# Patient Record
Sex: MALE | Race: WHITE | Employment: OTHER | URBAN - METROPOLITAN AREA
[De-identification: names, ages, dates, MRNs, and addresses within clinical notes are randomized per-mention and may not be internally consistent; named-entity substitution may affect disease eponyms.]

---

## 2017-11-06 ENCOUNTER — APPOINTMENT (OUTPATIENT)
Dept: CT IMAGING | Age: 72
DRG: 064 | End: 2017-11-06
Attending: EMERGENCY MEDICINE
Payer: MEDICARE

## 2017-11-06 ENCOUNTER — APPOINTMENT (OUTPATIENT)
Dept: MRI IMAGING | Age: 72
DRG: 064 | End: 2017-11-06
Attending: FAMILY MEDICINE
Payer: MEDICARE

## 2017-11-06 ENCOUNTER — HOSPITAL ENCOUNTER (INPATIENT)
Age: 72
LOS: 4 days | Discharge: NURSING, REHAB OR LONG-TERM CARE | DRG: 064 | End: 2017-11-10
Attending: EMERGENCY MEDICINE | Admitting: FAMILY MEDICINE
Payer: MEDICARE

## 2017-11-06 DIAGNOSIS — I63.9 CEREBROVASCULAR ACCIDENT (CVA), UNSPECIFIED MECHANISM (HCC): Primary | ICD-10-CM

## 2017-11-06 DIAGNOSIS — R47.01 APHASIA: ICD-10-CM

## 2017-11-06 DIAGNOSIS — I63.412 CEREBROVASCULAR ACCIDENT (CVA) DUE TO EMBOLISM OF LEFT MIDDLE CEREBRAL ARTERY (HCC): ICD-10-CM

## 2017-11-06 PROBLEM — I61.9 ICH (INTRACEREBRAL HEMORRHAGE) (HCC): Status: ACTIVE | Noted: 2017-11-06

## 2017-11-06 LAB
ALBUMIN SERPL-MCNC: 3.4 G/DL (ref 3.5–5)
ALBUMIN/GLOB SERPL: 1 {RATIO} (ref 1.1–2.2)
ALP SERPL-CCNC: 67 U/L (ref 45–117)
ALT SERPL-CCNC: 40 U/L (ref 12–78)
ANION GAP SERPL CALC-SCNC: 8 MMOL/L (ref 5–15)
APPEARANCE UR: CLEAR
AST SERPL-CCNC: 21 U/L (ref 15–37)
ATRIAL RATE: 71 BPM
BACTERIA URNS QL MICRO: NEGATIVE /HPF
BASOPHILS # BLD: 0 K/UL (ref 0–0.1)
BASOPHILS NFR BLD: 0 % (ref 0–1)
BILIRUB SERPL-MCNC: 0.4 MG/DL (ref 0.2–1)
BILIRUB UR QL: NEGATIVE
BUN SERPL-MCNC: 10 MG/DL (ref 6–20)
BUN/CREAT SERPL: 11 (ref 12–20)
CALCIUM SERPL-MCNC: 8.6 MG/DL (ref 8.5–10.1)
CALCULATED P AXIS, ECG09: 32 DEGREES
CALCULATED R AXIS, ECG10: -6 DEGREES
CALCULATED T AXIS, ECG11: 11 DEGREES
CHLORIDE SERPL-SCNC: 107 MMOL/L (ref 97–108)
CO2 SERPL-SCNC: 23 MMOL/L (ref 21–32)
COLOR UR: NORMAL
CREAT SERPL-MCNC: 0.95 MG/DL (ref 0.7–1.3)
DIAGNOSIS, 93000: NORMAL
EOSINOPHIL # BLD: 0 K/UL (ref 0–0.4)
EOSINOPHIL NFR BLD: 1 % (ref 0–7)
EPITH CASTS URNS QL MICRO: NORMAL /LPF
ERYTHROCYTE [DISTWIDTH] IN BLOOD BY AUTOMATED COUNT: 12.4 % (ref 11.5–14.5)
GLOBULIN SER CALC-MCNC: 3.4 G/DL (ref 2–4)
GLUCOSE BLD STRIP.AUTO-MCNC: 139 MG/DL (ref 65–100)
GLUCOSE BLD STRIP.AUTO-MCNC: 202 MG/DL (ref 65–100)
GLUCOSE SERPL-MCNC: 136 MG/DL (ref 65–100)
GLUCOSE UR STRIP.AUTO-MCNC: NEGATIVE MG/DL
HCT VFR BLD AUTO: 39.1 % (ref 36.6–50.3)
HGB BLD-MCNC: 13.6 G/DL (ref 12.1–17)
HGB UR QL STRIP: NEGATIVE
HYALINE CASTS URNS QL MICRO: NORMAL /LPF (ref 0–5)
INR BLD: 1.1 (ref 0.9–1.2)
KETONES UR QL STRIP.AUTO: NEGATIVE MG/DL
LEUKOCYTE ESTERASE UR QL STRIP.AUTO: NEGATIVE
LYMPHOCYTES # BLD: 1.5 K/UL (ref 0.8–3.5)
LYMPHOCYTES NFR BLD: 22 % (ref 12–49)
MCH RBC QN AUTO: 32.2 PG (ref 26–34)
MCHC RBC AUTO-ENTMCNC: 34.8 G/DL (ref 30–36.5)
MCV RBC AUTO: 92.7 FL (ref 80–99)
MONOCYTES # BLD: 0.4 K/UL (ref 0–1)
MONOCYTES NFR BLD: 5 % (ref 5–13)
NEUTS SEG # BLD: 4.9 K/UL (ref 1.8–8)
NEUTS SEG NFR BLD: 72 % (ref 32–75)
NITRITE UR QL STRIP.AUTO: NEGATIVE
P-R INTERVAL, ECG05: 160 MS
PH UR STRIP: 5 [PH] (ref 5–8)
PLATELET # BLD AUTO: 213 K/UL (ref 150–400)
POTASSIUM SERPL-SCNC: 4.5 MMOL/L (ref 3.5–5.1)
PROT SERPL-MCNC: 6.8 G/DL (ref 6.4–8.2)
PROT UR STRIP-MCNC: NEGATIVE MG/DL
Q-T INTERVAL, ECG07: 372 MS
QRS DURATION, ECG06: 70 MS
QTC CALCULATION (BEZET), ECG08: 404 MS
RBC # BLD AUTO: 4.22 M/UL (ref 4.1–5.7)
RBC #/AREA URNS HPF: NORMAL /HPF (ref 0–5)
SERVICE CMNT-IMP: ABNORMAL
SERVICE CMNT-IMP: ABNORMAL
SODIUM SERPL-SCNC: 138 MMOL/L (ref 136–145)
SP GR UR REFRACTOMETRY: 1.01 (ref 1–1.03)
UR CULT HOLD, URHOLD: NORMAL
UROBILINOGEN UR QL STRIP.AUTO: 0.2 EU/DL (ref 0.2–1)
VENTRICULAR RATE, ECG03: 71 BPM
WBC # BLD AUTO: 6.8 K/UL (ref 4.1–11.1)
WBC URNS QL MICRO: NORMAL /HPF (ref 0–4)

## 2017-11-06 PROCEDURE — 70496 CT ANGIOGRAPHY HEAD: CPT

## 2017-11-06 PROCEDURE — 93005 ELECTROCARDIOGRAM TRACING: CPT

## 2017-11-06 PROCEDURE — 36415 COLL VENOUS BLD VENIPUNCTURE: CPT | Performed by: EMERGENCY MEDICINE

## 2017-11-06 PROCEDURE — 70450 CT HEAD/BRAIN W/O DYE: CPT

## 2017-11-06 PROCEDURE — 74011250637 HC RX REV CODE- 250/637: Performed by: FAMILY MEDICINE

## 2017-11-06 PROCEDURE — 65270000029 HC RM PRIVATE

## 2017-11-06 PROCEDURE — 70551 MRI BRAIN STEM W/O DYE: CPT

## 2017-11-06 PROCEDURE — 81001 URINALYSIS AUTO W/SCOPE: CPT | Performed by: EMERGENCY MEDICINE

## 2017-11-06 PROCEDURE — 80053 COMPREHEN METABOLIC PANEL: CPT | Performed by: EMERGENCY MEDICINE

## 2017-11-06 PROCEDURE — 99285 EMERGENCY DEPT VISIT HI MDM: CPT

## 2017-11-06 PROCEDURE — 82962 GLUCOSE BLOOD TEST: CPT

## 2017-11-06 PROCEDURE — 74011000258 HC RX REV CODE- 258: Performed by: EMERGENCY MEDICINE

## 2017-11-06 PROCEDURE — 74011250636 HC RX REV CODE- 250/636: Performed by: FAMILY MEDICINE

## 2017-11-06 PROCEDURE — 85025 COMPLETE CBC W/AUTO DIFF WBC: CPT | Performed by: EMERGENCY MEDICINE

## 2017-11-06 PROCEDURE — 74011636320 HC RX REV CODE- 636/320: Performed by: EMERGENCY MEDICINE

## 2017-11-06 PROCEDURE — 74011636637 HC RX REV CODE- 636/637: Performed by: FAMILY MEDICINE

## 2017-11-06 PROCEDURE — 85610 PROTHROMBIN TIME: CPT

## 2017-11-06 RX ORDER — ATORVASTATIN CALCIUM 20 MG/1
20 TABLET, FILM COATED ORAL
Status: DISCONTINUED | OUTPATIENT
Start: 2017-11-06 | End: 2017-11-10 | Stop reason: HOSPADM

## 2017-11-06 RX ORDER — METFORMIN HYDROCHLORIDE 500 MG/1
500 TABLET ORAL 2 TIMES DAILY WITH MEALS
COMMUNITY

## 2017-11-06 RX ORDER — SODIUM CHLORIDE 0.9 % (FLUSH) 0.9 %
5-10 SYRINGE (ML) INJECTION EVERY 8 HOURS
Status: DISCONTINUED | OUTPATIENT
Start: 2017-11-06 | End: 2017-11-10 | Stop reason: HOSPADM

## 2017-11-06 RX ORDER — ACETAMINOPHEN 325 MG/1
650 TABLET ORAL
Status: DISCONTINUED | OUTPATIENT
Start: 2017-11-06 | End: 2017-11-10 | Stop reason: HOSPADM

## 2017-11-06 RX ORDER — HYDRALAZINE HYDROCHLORIDE 20 MG/ML
20 INJECTION INTRAMUSCULAR; INTRAVENOUS
Status: DISCONTINUED | OUTPATIENT
Start: 2017-11-06 | End: 2017-11-10 | Stop reason: HOSPADM

## 2017-11-06 RX ORDER — SODIUM CHLORIDE 0.9 % (FLUSH) 0.9 %
10 SYRINGE (ML) INJECTION
Status: COMPLETED | OUTPATIENT
Start: 2017-11-06 | End: 2017-11-06

## 2017-11-06 RX ORDER — ASPIRIN 325 MG
325 TABLET ORAL DAILY
Status: DISCONTINUED | OUTPATIENT
Start: 2017-11-07 | End: 2017-11-06

## 2017-11-06 RX ORDER — INSULIN LISPRO 100 [IU]/ML
INJECTION, SOLUTION INTRAVENOUS; SUBCUTANEOUS EVERY 6 HOURS
Status: DISCONTINUED | OUTPATIENT
Start: 2017-11-06 | End: 2017-11-08

## 2017-11-06 RX ORDER — FAMOTIDINE 20 MG/1
20 TABLET, FILM COATED ORAL EVERY 12 HOURS
Status: DISCONTINUED | OUTPATIENT
Start: 2017-11-06 | End: 2017-11-10 | Stop reason: HOSPADM

## 2017-11-06 RX ORDER — ATORVASTATIN CALCIUM 20 MG/1
20 TABLET, FILM COATED ORAL
COMMUNITY

## 2017-11-06 RX ORDER — BROMOCRIPTINE MESYLATE 2.5 MG/1
2.5 TABLET ORAL
Status: DISCONTINUED | OUTPATIENT
Start: 2017-11-06 | End: 2017-11-10 | Stop reason: HOSPADM

## 2017-11-06 RX ORDER — DEXTROSE 50 % IN WATER (D50W) INTRAVENOUS SYRINGE
12.5-25 AS NEEDED
Status: DISCONTINUED | OUTPATIENT
Start: 2017-11-06 | End: 2017-11-10 | Stop reason: HOSPADM

## 2017-11-06 RX ORDER — MAGNESIUM SULFATE 100 %
4 CRYSTALS MISCELLANEOUS AS NEEDED
Status: DISCONTINUED | OUTPATIENT
Start: 2017-11-06 | End: 2017-11-10 | Stop reason: HOSPADM

## 2017-11-06 RX ORDER — SODIUM CHLORIDE 0.9 % (FLUSH) 0.9 %
5-10 SYRINGE (ML) INJECTION AS NEEDED
Status: DISCONTINUED | OUTPATIENT
Start: 2017-11-06 | End: 2017-11-10 | Stop reason: HOSPADM

## 2017-11-06 RX ORDER — SODIUM CHLORIDE 9 MG/ML
75 INJECTION, SOLUTION INTRAVENOUS CONTINUOUS
Status: DISCONTINUED | OUTPATIENT
Start: 2017-11-06 | End: 2017-11-08

## 2017-11-06 RX ORDER — ACETAMINOPHEN 650 MG/1
650 SUPPOSITORY RECTAL
Status: DISCONTINUED | OUTPATIENT
Start: 2017-11-06 | End: 2017-11-10 | Stop reason: HOSPADM

## 2017-11-06 RX ORDER — BROMOCRIPTINE MESYLATE 2.5 MG/1
2.5 TABLET ORAL
COMMUNITY

## 2017-11-06 RX ORDER — ASPIRIN 325 MG
325 TABLET ORAL DAILY
Status: DISCONTINUED | OUTPATIENT
Start: 2017-11-06 | End: 2017-11-10 | Stop reason: HOSPADM

## 2017-11-06 RX ADMIN — IOPAMIDOL 100 ML: 755 INJECTION, SOLUTION INTRAVENOUS at 13:09

## 2017-11-06 RX ADMIN — BROMOCRIPTINE MESYLATE 2.5 MG: 2.5 TABLET ORAL at 21:49

## 2017-11-06 RX ADMIN — Medication 10 ML: at 13:09

## 2017-11-06 RX ADMIN — SODIUM CHLORIDE 75 ML/HR: 900 INJECTION, SOLUTION INTRAVENOUS at 15:22

## 2017-11-06 RX ADMIN — INSULIN LISPRO 3 UNITS: 100 INJECTION, SOLUTION INTRAVENOUS; SUBCUTANEOUS at 18:51

## 2017-11-06 RX ADMIN — SODIUM CHLORIDE 100 ML: 900 INJECTION, SOLUTION INTRAVENOUS at 13:09

## 2017-11-06 RX ADMIN — ATORVASTATIN CALCIUM 20 MG: 20 TABLET, FILM COATED ORAL at 21:49

## 2017-11-06 RX ADMIN — FAMOTIDINE 20 MG: 20 TABLET, FILM COATED ORAL at 21:49

## 2017-11-06 RX ADMIN — Medication 10 ML: at 16:00

## 2017-11-06 RX ADMIN — Medication 10 ML: at 21:49

## 2017-11-06 NOTE — ED NOTES
Spoke with patient's friend Glory Madden [phone] who stated patient was just discharge from [facility] 10/31. Patient was admitted approx two weeks ago for a stroke. He reports that he spoke to the patient Saturday and he was speaking normally and was coherant.

## 2017-11-06 NOTE — ED PROVIDER NOTES
HPI Comments: 71 y.o. male with past medical history significant for DM and hyperlipidemia who presents from home via EMS with chief complaint of aphasia. EMS reports that they were dispatched when the pt's  visiting his home became alarmed by the pt's confusion and expressive and receptive aphasia. Upon EMS arrival, pt was able to follow simple commands and reply yes and no appropriately. However, pt's condition worsened en route as he became less able to follow commands with some possible right arm drift. [facility] notes that the pt spoke on the phone to his friend who noticed some level of confusion but was not concerned - last known well is unknown. EMS notes pt with h/o stroke. There are no other acute medical concerns at this time. Note written by Joanie Michelle, as dictated by Timbo Hoskins MD 11:55 AM         UPDATE -- 1:23 PM  Nurse has spoke to the pt's friend who states that he is the only person that the pt has as he has alienated his family. Friend states that the pt had a stroke and was admitted to [address] [place] [address] and was discharged 6 days ago. Note written by Joanie Michelle, as dictated by Timbo Hoskins MD 1:23 PM      The history is provided by the EMS personnel. The history is limited by the condition of the patient (AMS). No  was used. No past medical history on file. No past surgical history on file. No family history on file. Social History     Social History    Marital status: N/A     Spouse name: N/A    Number of children: N/A    Years of education: N/A     Occupational History    Not on file.      Social History Main Topics    Smoking status: Not on file    Smokeless tobacco: Not on file    Alcohol use Not on file    Drug use: Not on file    Sexual activity: Not on file     Other Topics Concern    Not on file     Social History Narrative         ALLERGIES: Review of patient's allergies indicates not on file.    Review of Systems   Unable to perform ROS: Mental status change       Vitals:    11/06/17 1202   BP: 120/82   Pulse: 74   Resp: 16   Temp: 98.1 °F (36.7 °C)   SpO2: 98%            Physical Exam   Constitutional: He appears well-developed and well-nourished. No distress. HENT:   Head: Normocephalic and atraumatic. Right Ear: External ear normal.   Left Ear: External ear normal.   Nose: Nose normal.   Mouth/Throat: Oropharynx is clear and moist.   Eyes: Conjunctivae and EOM are normal. Pupils are equal, round, and reactive to light. No scleral icterus. Neck: Normal range of motion. Neck supple. No JVD present. No tracheal deviation present. No thyromegaly present. Cardiovascular: Normal rate, regular rhythm and normal heart sounds. Exam reveals no gallop and no friction rub. No murmur heard. Pulmonary/Chest: Effort normal and breath sounds normal. No respiratory distress. He has no wheezes. He has no rales. He exhibits no tenderness. Abdominal: Soft. Bowel sounds are normal. He exhibits no distension and no mass. There is no tenderness. There is no rebound and no guarding. Musculoskeletal: Normal range of motion. He exhibits no edema or tenderness. Lymphadenopathy:     He has no cervical adenopathy. Neurological: He is alert. He has normal strength. He displays no atrophy and no tremor. He exhibits normal muscle tone. Coordination and gait normal.   Both receptive and expressive aphasia. Slight right-sided facial droop. Moving both upper and lower extremities with good strength equally. No pronator drift. Skin: Skin is warm and dry. No rash noted. He is not diaphoretic. No erythema. Psychiatric: Thought content normal.   Unable to assess. Nursing note and vitals reviewed.      Note written by Joanie Gautam, as dictated by Evangelina Atwood MD 1:42 PM      MDM  Number of Diagnoses or Management Options  Diagnosis management comments: Impression: 72-year-old male presenting to the emergency department with onset of slurred speech on Saturday per the son although I have not been able to speak with him.  called EMS this morning for more of slurred speech and difficulty understanding instructions. Per EMS at one time he had a pronounced right facial droop as well as a right pronator drift. His blood sugar was within normal limits. The patient's differential includes TIA versus CVA, doubt this represents intracerebral hemorrhage. Doubt this represents metabolic or infectious etiology. Plan of care will be to continue with code stroke protocol treat accordingly. Critical Care  Total time providing critical care: (Total critical care time spend exclusive of procedures: 60 minutes  )    ED Course       Procedures      CONSULT NOTE:  12:07 PM Johana Rios MD spoke with Dr. Daisha Lee, Consult for TeleNeurology. Discussed available diagnostic tests and clinical findings. She is in agreement with care plans as outlined. Dr. Daisha Lee will evaluate the pt via TeleNeurology screen. 12:26 PM Dr. Daisha Lee evaluated the pt and recommended CTA. Pt is out of the window for other interventions at this time. ED EKG interpretation:  Rhythm: sinus rhythm with premature atrial complexes. Rate (approx.): 71; Axis: normal; ST/T wave: normal.   Note written by Joanie Gibson, as dictated by Johana Rios MD 12:15 PM    CONSULT NOTE:  1:44 PM Johana Rios MD spoke with Dr. Keely Cheatham, Consult for Hospitalist.  Discussed available diagnostic tests and clinical findings. He is in agreement with care plans as outlined. Dr. Keely Cheatham will see and admit the pt for CVA work-up / evaluation.

## 2017-11-06 NOTE — ED NOTES
Patient speaking in clear sentences but unable to get his words out. Patient very frustrated. Able to move all extremities.

## 2017-11-06 NOTE — ED NOTES
Patient to CT on monitor with RN. Upon arrival to CT patient out of bed to restroom. RN stayed at patients side. Gait steady.

## 2017-11-06 NOTE — ED NOTES
Teleneurology on screen at bedside. Patient able to follow some commands with lots of demonstration.

## 2017-11-06 NOTE — ED TRIAGE NOTES
TRIAGE: Patient arrives by EMS from home with difficulty speaking and difficulty following commands. Patient states he didn't feel good this morning. Patient not following commands consistently for NIH. Difficulty finding words.  Patient lives alone and  called EMS for UMicIt stroke\" at 1100AM. BG

## 2017-11-06 NOTE — ED NOTES
Awaiting records from [facility]. Patient in bed with call light in reach and bed alarm on. Will continue to monitor.

## 2017-11-06 NOTE — ROUTINE PROCESS
TRANSFER - OUT REPORT:    Verbal report given to Ara RICH(name) on Rafi Butt  being transferred to ICU(unit) for routine progression of care       Report consisted of patients Situation, Background, Assessment and   Recommendations(SBAR). Information from the following report(s) SBAR, ED Summary, [facility] and Recent Results was reviewed with the receiving nurse. Lines:   Peripheral IV 11/06/17 Left Antecubital (Active)   Site Assessment Clean, dry, & intact 11/6/2017 12:03 PM   Phlebitis Assessment 0 11/6/2017 12:03 PM   Infiltration Assessment 0 11/6/2017 12:03 PM   Dressing Status Clean, dry, & intact 11/6/2017 12:03 PM       Peripheral IV 11/06/17 Right Antecubital (Active)   Site Assessment Clean, dry, & intact 11/6/2017 12:58 PM   Phlebitis Assessment 0 11/6/2017 12:58 PM   Infiltration Assessment 0 11/6/2017 12:58 PM   Dressing Status Clean, dry, & intact 11/6/2017 12:58 PM        Opportunity for questions and clarification was provided.       Patient transported with:   Monitor  Registered Nurse

## 2017-11-06 NOTE — IP AVS SNAPSHOT
[address] [place] [address] 
[phone] Patient: Kofi Dwyer MRN: [id number] :1945 About your hospitalization You were admitted on:  2017 You last received care in the:  [facility] 6S NEURO-SCI TELE You were discharged on:  November 10, 2017 Why you were hospitalized Your primary diagnosis was:  Type Ii Diabetes Mellitus (Hcc) Your diagnoses also included:  Cva (Cerebral Vascular Accident) (Hcc) Things You Need To Do (next 8 weeks) Follow up with Dean Saldivar MD  
  
Phone:  [phone] Where:  [facility], [address] [place] [address] Discharge Orders None A check kanchan indicates which time of day the medication should be taken. My Medications TAKE these medications as instructed Instructions Each Dose to Equal  
 Morning Noon Evening Bedtime  
 aspirin 325 mg tablet Commonly known as:  ASPIRIN Start taking on:  2017 Your last dose was: Your next dose is: Take 1 Tab by mouth daily. 325 mg  
    
   
   
   
  
 atorvastatin 20 mg tablet Commonly known as:  LIPITOR Your last dose was: Your next dose is: Take 20 mg by mouth nightly. 20 mg  
    
   
   
   
  
 bromocriptine 2.5 mg tablet Commonly known as:  PARLODEL Your last dose was: Your next dose is: Take 2.5 mg by mouth nightly. 2.5 mg  
    
   
   
   
  
 clopidogrel 75 mg Tab Commonly known as:  PLAVIX Start taking on:  2017 Your last dose was: Your next dose is: Take 1 Tab by mouth daily. 75 mg  
    
   
   
   
  
 metFORMIN 500 mg tablet Commonly known as:  GLUCOPHAGE Your last dose was: Your next dose is: Take 500 mg by mouth two (2) times daily (with meals). 500 mg  
    
   
   
   
  
 QUEtiapine 25 mg tablet Commonly known as:  SEROquel Your last dose was: Your next dose is: Take 0.5 Tabs by mouth nightly. 12.5 mg Where to Get Your Medications Information on where to get these meds will be given to you by the nurse or doctor. ! Ask your nurse or doctor about these medications  
  aspirin 325 mg tablet  
 clopidogrel 75 mg Tab QUEtiapine 25 mg tablet Discharge Instructions Discharge Instructions PATIENT ID: Carla Contreras MRN: [id number] YOB: 1945 DATE OF ADMISSION: 11/6/2017 11:51 AM   
DATE OF DISCHARGE: 11/10/2017 PRIMARY CARE PROVIDER: Sofia Wells MD  
 
 
DISCHARGING PHYSICIAN: Neto Luna MD   
To contact this individual call [phone] and ask the  to page. If unavailable ask to be transferred the Adult Hospitalist Department. DISCHARGE DIAGNOSES CVA CONSULTATIONS: IP CONSULT TO HOSPITALIST 
IP CONSULT TO NEUROSURGERY 
IP CONSULT TO NEUROLOGY 
IP CONSULT TO PSYCHIATRY IP CONSULT TO CARDIOLOGY PROCEDURES/SURGERIES: * No surgery found * PENDING TEST RESULTS:  
At the time of discharge the following test results are still pending: NA 
 
FOLLOW UP APPOINTMENTS:  
Follow-up Information Follow up With Details Comments Contact Info Sofia Wells MD   [address] [facility] [address] [place] [address] 
[phone] ADDITIONAL CARE RECOMMENDATIONS:  
Please follow up with your primary care provider in 1 to 2 weeks of discharge. Please follow up with Neurologist, Dr Morena Clifford in 3 to 4 weeks of discharge DIET: Cardiac Diet and Diabetic Diet ACTIVITY: Activity as tolerated DISCHARGE MEDICATIONS: 
 See Medication Reconciliation Form · It is important that you take the medication exactly as they are prescribed.   
· Keep your medication in the bottles provided by the pharmacist and keep a list of the medication names, dosages, and times to be taken in your wallet. · Do not take other medications without consulting your doctor. NOTIFY YOUR PHYSICIAN FOR ANY OF THE FOLLOWING:  
Fever over 101 degrees for 24 hours. Chest pain, shortness of breath, fever, chills, nausea, vomiting, diarrhea, change in mentation, falling, weakness, bleeding. Severe pain or pain not relieved by medications. Or, any other signs or symptoms that you may have questions about. DISPOSITION: 
  Home With: 
 OT  PT  [facility]  RN  
  
 SNF/Inpatient Rehab/LTAC Independent/assisted living Hospice Other: CDMP Checked: Yes X Signed:  
Alta Lopez MD 
11/10/2017 
4:04 PM 
 
 
 
  
  
  
Introducing [facility]! Kirstin Johns introduces ESILLAGE patient portal. Now you can access parts of your medical record, email your doctor's office, and request medication refills online. 1. In your internet browser, go to [email or web address] 2. Click on the First Time User? Click Here link in the Sign In box. You will see the New Member Sign Up page. 3. Enter your ESILLAGE Access Code exactly as it appears below. You will not need to use this code after youve completed the sign-up process. If you do not sign up before the expiration date, you must request a new code. · ESILLAGE Access Code: [email or web address] Expires: 2/6/2018  1:48 PM 
 
4. Enter the last four digits of your Social Security Number (xxxx) and Date of Birth (mm/dd/yyyy) as indicated and click Submit. You will be taken to the next sign-up page. 5. Create a ESILLAGE ID. This will be your ESILLAGE login ID and cannot be changed, so think of one that is secure and easy to remember. 6. Create a ESILLAGE password. You can change your password at any time. 7. Enter your Password Reset Question and Answer. This can be used at a later time if you forget your password. 8. Enter your e-mail address. You will receive e-mail notification when new information is available in [address] 9. Click Sign Up. You can now view and download portions of your medical record. 10. Click the Download Summary menu link to download a portable copy of your medical information. If you have questions, please visit the Frequently Asked Questions section of the [employer] website. Remember, [employer] is NOT to be used for urgent needs. For medical emergencies, dial 911. Now available from your iPhone and Android! Providers Seen During Your Hospitalization Provider Specialty Primary office phone Brandon Navarrete MD Emergency Medicine [phone] Mohamud Craig MD Hospitalist [phone] Ban Reese MD [facility] [phone] Your Primary Care Physician (PCP) Primary Care Physician Office Phone Office Fax Juni Lopez [phone] You are allergic to the following No active allergies Recent Documentation Weight Smoking Status 84.8 kg Never Assessed Emergency Contacts Name Discharge Info Relation Home Work Mobile Unknown,Unknown N/A  AT THIS TIME [6] Unknown [9] [phone] Peggi Ayers  Caregiver [13] [phone] Patient Belongings The following personal items are in your possession at time of discharge: 
  Dental Appliances: None  Visual Aid: None      Home Medications: None   Jewelry: None  Clothing: At bedside, Shirt    Other Valuables: None Please provide this summary of care documentation to your next provider. Signatures-by signing, you are acknowledging that this After Visit Summary has been reviewed with you and you have received a copy. Patient Signature:  ____________________________________________________________  Date:  ____________________________________________________________  
  
Vazquez Moses    
    
 Provider Signature:  ____________________________________________________________ Date:  ____________________________________________________________

## 2017-11-06 NOTE — PROGRESS NOTES
Spiritual Care Assessment/Progress Notes    Naveen Kramer [id number]  [id number]    1945  71 y.o.  male    Patient Telephone Number: There is no home phone number on file. Hinduism Affiliation:    Language:    No emergency contact information on file. There are no active problems to display for this patient. Date: 11/6/2017       Level of Hinduism/Spiritual Activity:  []         Involved in maria e tradition/spiritual practice    []         Not involved in maria e tradition/spiritual practice  []         Spiritually oriented    []         Claims no spiritual orientation    []         seeking spiritual identity  []         Feels alienated from Mosque practice/tradition  []         Feels angry about Mosque practice/tradition  [x]         Spirituality/Mosque tradition IS a resource for coping at this time.   []         Not able to assess due to medical condition    Services Provided Today:  [x]         crisis intervention    []         reading Scriptures  [x]         spiritual assessment    []         prayer  [x]         empathic listening/emotional support  []         rites and rituals (cite in comments)  []         life review     []         Mosque support  []         theological development   []         advocacy  []         ethical dialog     []         blessing  []         bereavement support    []         support to family  []         anticipatory grief support   []         help with AMD  []         spiritual guidance    []         meditation      Spiritual Care Needs  [x]         Emotional Support  []         Spiritual/Hinduism Care  []         Loss/Adjustment  []         Advocacy/Referral                /Ethics  []         No needs expressed at               this time  []         Other: (note in               comments)  [address]  []         Follow up visits with               pt/family  []         Provide materials  []         Schedule sacraments  []         Contact Community               Clergy  [x]         Follow up as needed  []         Other: (note in               comments)     Comments: Responded to Code Stroke called for Mr Shailesh Diaz in ED-30. Mr Shailesh Diaz was lying quietly on stretcher and nurse was present at time of 's arrival. No family was present. Offered brief words of support to patient, who was having great difficulty trying to express himself verbally. Patient was able to give permission for  to keep him in prayers. : Rev. Lauren Saunders; [facility], to contact [address] [facility] call: [phone]

## 2017-11-06 NOTE — PROGRESS NOTES
Admission Medication Reconciliation:    Information obtained from:  patient, medication from home    Comments/Recommendations:   Due to the patient's condition, could not confirm medication allergies or time of medication administration. It does appear the the patient took his medications on 11/7/17. Changes made to Prior to Admission (PTA) Medication List:   ?   Medications Added:   - None   ? Medications Changed:   - changed administration times of atorvastatin and bromocriptine to QHS  ? Medications Removed:   - None         Significant PMH/Disease States:   Past Medical History:   Diagnosis Date    Diabetes ([facility] [address]     Hyperlipemia        Chief Complaint for this Admission:    Chief Complaint   Patient presents with    Aphasia       Allergies:  Review of patient's allergies indicates not on file. Prior to Admission Medications:   Prior to Admission Medications   Prescriptions Last Dose Informant Patient Reported? Taking?   atorvastatin (LIPITOR) 20 mg tablet 11/5/2017 at Unknown time  Yes Yes   Sig: Take 20 mg by mouth daily. bromocriptine (PARLODEL) 2.5 mg tablet 11/5/2017 at Unknown time  Yes Yes   Sig: Take 2.5 mg by mouth daily. metFORMIN (GLUCOPHAGE) 500 mg tablet 11/5/2017 at Unknown time  Yes Yes   Sig: Take 500 mg by mouth two (2) times daily (with meals). Facility-Administered Medications: None       Thank you for allowing pharmacy to participate in the coordination of this patient's care. If you have any other questions, please contact the medication reconciliation pharmacist at [phone]. Marianela Cm, Pharm. D.

## 2017-11-06 NOTE — PROGRESS NOTES
Intensivist    Pt is a 70 yo male with h/o ischemic CVA 2 weeks ago and was in [facility] EMERGENCY [facility] and was recently discharged home. This am he had worsened expressive aphasia and R facial droop and was brought to [facility]. Head CT shows area of ischemia (subacute in the L parietal region with tiny area of punctate hemorrhage)    Not a tpa candidate  CTA - no obvious occlusion    He says that he feels better and moves all extremities. He is alert and does have some trouble finding words. His son at bedside says that is new compared to a phone conversation he had with him over the weekend    Not on File  Past Medical History:   Diagnosis Date    Diabetes ([facility] [address]     Hyperlipemia      History reviewed. No pertinent surgical history. Prior to Admission medications    Medication Sig Start Date End Date Taking? Authorizing Provider   bromocriptine (PARLODEL) 2.5 mg tablet Take 2.5 mg by mouth nightly. Yes Kulwant Flores MD   atorvastatin (LIPITOR) 20 mg tablet Take 20 mg by mouth nightly. Yes Kulwant Flores MD   metFORMIN (GLUCOPHAGE) 500 mg tablet Take 500 mg by mouth two (2) times daily (with meals).    Yes Kulwant Flores MD     Patient Vitals for the past 4 hrs:   Temp Pulse Resp BP SpO2   11/06/17 1516 98 °F (36.7 °C) 79 21 147/88 -   11/06/17 1445 98 °F (36.7 °C) 69 17 150/82 100 %   11/06/17 1430 - 71 19 (!) 149/94 98 %   11/06/17 1415 - 63 14 134/88 97 %   11/06/17 1400 - 86 23 (!) 153/96 98 %   11/06/17 1330 - 71 20 (!) 161/93 96 %   11/06/17 1230 - 76 21 (!) 142/99 96 %   11/06/17 1215 - 77 18 (!) 138/16 96 %   11/06/17 1205 - - - 120/82 -   11/06/17 1202 98.1 °F (36.7 °C) 74 16 120/82 98 %       Alert  Expressive aphasia  Moves all extremities equally  Mmm  CTA  RRR    CT and CTA as above  MRI brain ordered    Lab:  Recent Labs      11/06/17   1205  11/06/17   1202   WBC  6.8   --    HGB  13.6   --    PLT  213   --    NA  138   --    K  4.5   --    CL  107   --    CO2  23   --    BUN  10   --    CREA 0.95   --    GLU  136*   --    CA  8.6   --    INR   --   1.1   TBILI  0.4   --    SGOT  21   --      Impression:  1. Subacute L parietal CVA with small punctate hemorrhage   2.  Aphasia - no new occlusion on CTA - not a tpa candidate - apparently had dysphagia on discharge from [facility]  3. Small ICH  4. DM    --No NS issues  --would have neuro eval  --OK for NSTU from a critical care standpoint    Fanny Simmons MD

## 2017-11-06 NOTE — ROUTINE PROCESS
TRANSFER - IN REPORT:    Verbal report received from [address] RN(name) on Darian Mane  being received from ED(unit) for routine progression of care      Report consisted of patients Situation, Background, Assessment and   Recommendations(SBAR). Information from the following report(s) SBAR, Kardex, ED Summary, Procedure Summary, Intake/Output, MAR, Accordion and Recent Results was reviewed with the receiving nurse. Opportunity for questions and clarification was provided. Assessment completed upon patients arrival to unit and care assumed.

## 2017-11-06 NOTE — PROGRESS NOTES
CM asked if I could assist them in finding out any information on patient due to him arriving by EMS and they had only received a call from a  that did not give them a name or number. There is no Insurance information or anyone that answers the phone number that is on patients face sheet. I have tried looking at patient and trying to identify him on facebook but, was unable to find him. In the mean time someone has called into the ER and spoken to his nurse and she has a name and number at this time.   Hannah Rojas RN CRM

## 2017-11-06 NOTE — CONSULTS
Full consult to follow. Briefly, 70 yo hx of CVA admitted with new CVA symptoms (expressive aphasia). Head CT shows previously documented left parieto-occipital CVA with a small area of punctate hemorrhage with no mass effect. No role for neurosurgical intervention at this point. Thank you for this consultation.

## 2017-11-06 NOTE — CONSULTS
Neuro consult completed, dictated note to follow. Pt was just admitted to [facility] after presenting with N/V/aphasia, found to have acute left multifocal MCA infarcts, diagnosed with hypercholesterolemia, DM, and left ICA stenosis 50-79% during the admission, started on ASA 325mg daily, Lipitor 20mg daily, and metformin 500mg bid. He was discharged from [address] [place] [address] on 10/31/17. In review of the notes, current aphasia is definitely worse than at discharge from [facility]. Differential includes new CVA vs seizure with post-ictal Todds. Petechial hemorrhage in old ischemic CVA is not a concern. Does not need to be in the ICU. Continue ASA 325mg daily. MRI brain and EEG.

## 2017-11-06 NOTE — IP AVS SNAPSHOT
[address] 
[phone] Patient: Iris Skelton MRN: [id number] :1945 My Medications TAKE these medications as instructed Instructions Each Dose to Equal  
 Morning Noon Evening Bedtime  
 aspirin 325 mg tablet Commonly known as:  ASPIRIN Start taking on:  2017 Your last dose was: Your next dose is: Take 1 Tab by mouth daily. 325 mg  
    
   
   
   
  
 atorvastatin 20 mg tablet Commonly known as:  LIPITOR Your last dose was: Your next dose is: Take 20 mg by mouth nightly. 20 mg  
    
   
   
   
  
 bromocriptine 2.5 mg tablet Commonly known as:  PARLODEL Your last dose was: Your next dose is: Take 2.5 mg by mouth nightly. 2.5 mg  
    
   
   
   
  
 clopidogrel 75 mg Tab Commonly known as:  PLAVIX Start taking on:  2017 Your last dose was: Your next dose is: Take 1 Tab by mouth daily. 75 mg  
    
   
   
   
  
 metFORMIN 500 mg tablet Commonly known as:  GLUCOPHAGE Your last dose was: Your next dose is: Take 500 mg by mouth two (2) times daily (with meals). 500 mg  
    
   
   
   
  
 QUEtiapine 25 mg tablet Commonly known as:  SEROquel Your last dose was: Your next dose is: Take 0.5 Tabs by mouth nightly. 12.5 mg Where to Get Your Medications Information on where to get these meds will be given to you by the nurse or doctor. ! Ask your nurse or doctor about these medications  
  aspirin 325 mg tablet  
 clopidogrel 75 mg Tab QUEtiapine 25 mg tablet

## 2017-11-07 PROBLEM — I63.9 CVA (CEREBRAL VASCULAR ACCIDENT) (HCC): Status: ACTIVE | Noted: 2017-11-06

## 2017-11-07 LAB
CHOLEST SERPL-MCNC: 103 MG/DL
ERYTHROCYTE [DISTWIDTH] IN BLOOD BY AUTOMATED COUNT: 12.5 % (ref 11.5–14.5)
EST. AVERAGE GLUCOSE BLD GHB EST-MCNC: 171 MG/DL
GLUCOSE BLD STRIP.AUTO-MCNC: 125 MG/DL (ref 65–100)
GLUCOSE BLD STRIP.AUTO-MCNC: 125 MG/DL (ref 65–100)
GLUCOSE BLD STRIP.AUTO-MCNC: 140 MG/DL (ref 65–100)
GLUCOSE BLD STRIP.AUTO-MCNC: 141 MG/DL (ref 65–100)
HBA1C MFR BLD: 7.6 % (ref 4.2–6.3)
HCT VFR BLD AUTO: 39.2 % (ref 36.6–50.3)
HDLC SERPL-MCNC: 40 MG/DL
HDLC SERPL: 2.6 {RATIO} (ref 0–5)
HGB BLD-MCNC: 13.9 G/DL (ref 12.1–17)
LDLC SERPL CALC-MCNC: 42.8 MG/DL (ref 0–100)
LIPID PROFILE,FLP: NORMAL
MCH RBC QN AUTO: 32.6 PG (ref 26–34)
MCHC RBC AUTO-ENTMCNC: 35.5 G/DL (ref 30–36.5)
MCV RBC AUTO: 92 FL (ref 80–99)
PLATELET # BLD AUTO: 216 K/UL (ref 150–400)
RBC # BLD AUTO: 4.26 M/UL (ref 4.1–5.7)
SERVICE CMNT-IMP: ABNORMAL
TRIGL SERPL-MCNC: 101 MG/DL (ref ?–150)
VLDLC SERPL CALC-MCNC: 20.2 MG/DL
WBC # BLD AUTO: 6.7 K/UL (ref 4.1–11.1)

## 2017-11-07 PROCEDURE — 97161 PT EVAL LOW COMPLEX 20 MIN: CPT

## 2017-11-07 PROCEDURE — G8979 MOBILITY GOAL STATUS: HCPCS

## 2017-11-07 PROCEDURE — 36415 COLL VENOUS BLD VENIPUNCTURE: CPT | Performed by: FAMILY MEDICINE

## 2017-11-07 PROCEDURE — 74011636637 HC RX REV CODE- 636/637: Performed by: FAMILY MEDICINE

## 2017-11-07 PROCEDURE — 97165 OT EVAL LOW COMPLEX 30 MIN: CPT

## 2017-11-07 PROCEDURE — 74011250637 HC RX REV CODE- 250/637: Performed by: PSYCHIATRY & NEUROLOGY

## 2017-11-07 PROCEDURE — 95816 EEG AWAKE AND DROWSY: CPT | Performed by: PSYCHIATRY & NEUROLOGY

## 2017-11-07 PROCEDURE — 65660000000 HC RM CCU STEPDOWN

## 2017-11-07 PROCEDURE — 93306 TTE W/DOPPLER COMPLETE: CPT

## 2017-11-07 PROCEDURE — 83036 HEMOGLOBIN GLYCOSYLATED A1C: CPT | Performed by: FAMILY MEDICINE

## 2017-11-07 PROCEDURE — 82962 GLUCOSE BLOOD TEST: CPT

## 2017-11-07 PROCEDURE — G8978 MOBILITY CURRENT STATUS: HCPCS

## 2017-11-07 PROCEDURE — 85027 COMPLETE CBC AUTOMATED: CPT | Performed by: FAMILY MEDICINE

## 2017-11-07 PROCEDURE — 80061 LIPID PANEL: CPT | Performed by: FAMILY MEDICINE

## 2017-11-07 PROCEDURE — 74011250637 HC RX REV CODE- 250/637: Performed by: FAMILY MEDICINE

## 2017-11-07 RX ADMIN — FAMOTIDINE 20 MG: 20 TABLET, FILM COATED ORAL at 23:03

## 2017-11-07 RX ADMIN — Medication 10 ML: at 22:00

## 2017-11-07 RX ADMIN — Medication 10 ML: at 13:16

## 2017-11-07 RX ADMIN — ATORVASTATIN CALCIUM 20 MG: 20 TABLET, FILM COATED ORAL at 23:03

## 2017-11-07 RX ADMIN — INSULIN LISPRO 2 UNITS: 100 INJECTION, SOLUTION INTRAVENOUS; SUBCUTANEOUS at 13:16

## 2017-11-07 RX ADMIN — FAMOTIDINE 20 MG: 20 TABLET, FILM COATED ORAL at 09:08

## 2017-11-07 RX ADMIN — ASPIRIN 325 MG: 325 TABLET ORAL at 09:08

## 2017-11-07 RX ADMIN — Medication 10 ML: at 05:00

## 2017-11-07 NOTE — ROUTINE PROCESS
Bedside, Verbal and Written shift change report given to Raegan Rivera RN (oncoming nurse) by Maria C Diaz RN (offgoing nurse). Report included the following information SBAR, Kardex, ED Summary, Procedure Summary, Intake/Output, MAR, Accordion and Recent Results.

## 2017-11-07 NOTE — PROGRESS NOTES
Hospitalist Progress Note          Soco Hough MD  Please call  and page for questions. Call physician on-call through the  7pm-7am    Daily Progress Note: 11/7/2017    Primary care provider:Kulwant Flores MD    Date of admission: 11/6/2017 11:51 AM    Admission summery and hospital course:  81-year-old gentleman with past medical history of diabetes and hyperlipidemia, who presents to the hospital complaining of aphasia. Pt was just admitted to [facility] after presenting with N/V/aphasia, found to have acute left multifocal MCA infarcts, diagnosed with hypercholesterolemia, DM, and left ICA stenosis 50-79% during the admission, started on ASA 325mg daily, Lipitor 20mg daily, and metformin 500mg bid. He was discharged from [address] [place] [address] on 10/31/17. The patient apparently had his  visit at his home and the  got concerned because the patient was confused and was having expressive as well as receptive aphasia. EMS was called. Subjective:   Patient said she/he is feeling better today. Assessment/Plan:   CVA:  Improving. Continue PT/OT and SLT. Aphasia is definitely worse than at discharge from [facility] as per neurologist.  MRI showed moderate to large area of infarction predominantly in the left parietal and left temporal lobes with minimal associated superimposed hemorrhage in the left parietal lobe. There are small punctate foci of infarction in theperiventricular white matter on the left as well. Continue ASA, statin and serial neurological evaluation. Follow EEG report and neurology recommendation. Transfer to neuro floor. Encephalopathy: Due to above. Monitor. Hypertension:   BP is reasonable now. Continue to monitor with current medicine. DM:   Monitor with SSI for now. See orders for other plans. VTE prophylaxis: SCD. Code status: Full  Discussed plan of care with Patient/Family and Nurse. Pre-admission lived at home. Discharge planning: pending. Review of Systems:     Review of Systems:  Symptom  Y/N  Comments   Symptom  Y/N  Comments    Fever/Chills  n    Chest Pain  n    Poor Appetite  n    Edema   n    Cough  n   Abdominal Pain   n    Sputum  n   Joint Pain  n    SOB/LAUREANO  n   Pruritis/Rash      Nausea/vomit  n   Tolerating PT/OT      Diarrhea     Tolerating Diet      Constipation     Other      Could not obtain due to:         Objective:   Physical Exam:     Visit Vitals    BP (!) 111/91    Pulse 67    Temp 98.8 °F (37.1 °C)    Resp 19    Wt 88.2 kg (194 lb 7.1 oz)    SpO2 90%      O2 Device: Room air    Temp (24hrs), Av.4 °F (36.9 °C), Min:98 °F (36.7 °C), Max:99.3 °F (37.4 °C)    701 - 1900  In: 375 [I.V.:375]  Out: 200 [Urine:200]   1901 - 700  In: 1292.5 [P.O.:120; I.V.:1172.5]  Out: 1350 [Urine:1350]      General:  Alert, cooperative, no distress, appears stated age. Lungs:   Clear to auscultation bilaterally. Heart:  Regular rate and rhythm, S1, S2 normal, no murmur. Abdomen:   Soft, non-tender. Obese. Bowel sounds normal.   Extremities: Extremities normal, atraumatic, no cyanosis or edema. Skin: Skin color, texture, turgor normal. No rashes or lesions   Neurologic: CNII-XII intact. Patient is still confused. Data Review:       Recent Days:  Recent Labs      17   0452  17   1205   WBC  6.7  6.8   HGB  13.9  13.6   HCT  39.2  39.1   PLT  216  213     Recent Labs      17   1205  17   1202   NA  138   --    K  4.5   --    CL  107   --    CO2  23   --    GLU  136*   --    BUN  10   --    CREA  0.95   --    CA  8.6   --    ALB  3.4*   --    SGOT  21   --    ALT  40   --    INR   --   1.1     No results for input(s): PH, PCO2, PO2, HCO3, FIO2 in the last 72 hours.     24 Hour Results:  Recent Results (from the past 24 hour(s))   GLUCOSE, POC    Collection Time: 17  6:48 PM   Result Value Ref Range    Glucose (POC) 202 (H) 65 - 100 mg/dL Performed by Louise Wyman    GLUCOSE, POC    Collection Time: 11/07/17 12:08 AM   Result Value Ref Range    Glucose (POC) 125 (H) 65 - 100 mg/dL    Performed by Nicole Hassan    LIPID PANEL    Collection Time: 11/07/17  4:52 AM   Result Value Ref Range    LIPID PROFILE          Cholesterol, total 103 <200 MG/DL    Triglyceride 101 <150 MG/DL    HDL Cholesterol 40 MG/DL    LDL, calculated 42.8 0 - 100 MG/DL    VLDL, calculated 20.2 MG/DL    CHOL/HDL Ratio 2.6 0 - 5.0     HEMOGLOBIN A1C WITH EAG    Collection Time: 11/07/17  4:52 AM   Result Value Ref Range    Hemoglobin A1c 7.6 (H) 4.2 - 6.3 %    Est. average glucose 171 mg/dL   CBC W/O DIFF    Collection Time: 11/07/17  4:52 AM   Result Value Ref Range    WBC 6.7 4.1 - 11.1 K/uL    RBC 4.26 4.10 - 5.70 M/uL    HGB 13.9 12.1 - 17.0 g/dL    HCT 39.2 36.6 - 50.3 %    MCV 92.0 80.0 - 99.0 FL    MCH 32.6 26.0 - 34.0 PG    MCHC 35.5 30.0 - 36.5 g/dL    RDW 12.5 11.5 - 14.5 %    PLATELET 839 793 - 082 K/uL   GLUCOSE, POC    Collection Time: 11/07/17  4:56 AM   Result Value Ref Range    Glucose (POC) 141 (H) 65 - 100 mg/dL    Performed by Nicole Hassan    GLUCOSE, POC    Collection Time: 11/07/17  1:07 PM   Result Value Ref Range    Glucose (POC) 140 (H) 65 - 100 mg/dL    Performed by Louise Wyman        Problem List:  Problem List as of 11/7/2017  Date Reviewed: 11/6/2017          Codes Class Noted - Resolved    * (Principal)CVA (cerebral vascular accident) [facility]) ICD-10-CM: I63.9  ICD-9-CM: 434.91  11/6/2017 - Present              Medications reviewed  Current Facility-Administered Medications   Medication Dose Route Frequency    atorvastatin (LIPITOR) tablet 20 mg  20 mg Oral QHS    bromocriptine (PARLODEL) tablet 2.5 mg  2.5 mg Oral QHS    sodium chloride (NS) flush 5-10 mL  5-10 mL IntraVENous Q8H    sodium chloride (NS) flush 5-10 mL  5-10 mL IntraVENous PRN    acetaminophen (TYLENOL) tablet 650 mg  650 mg Oral Q4H PRN    Or    acetaminophen (TYLENOL) solution 650 mg  650 mg Per NG tube Q4H PRN    Or    acetaminophen (TYLENOL) suppository 650 mg  650 mg Rectal Q4H PRN    0.9% sodium chloride infusion  75 mL/hr IntraVENous CONTINUOUS    famotidine (PEPCID) tablet 20 mg  20 mg Oral Q12H    glucose chewable tablet 16 g  4 Tab Oral PRN    dextrose (D50W) injection syrg 12.5-25 g  12.5-25 g IntraVENous PRN    glucagon (GLUCAGEN) injection 1 mg  1 mg IntraMUSCular PRN    insulin lispro (HUMALOG) injection   SubCUTAneous Q6H    hydrALAZINE (APRESOLINE) 20 mg/mL injection 20 mg  20 mg IntraVENous Q6H PRN    aspirin (ASPIRIN) tablet 325 mg  325 mg Oral DAILY       Care Plan discussed with: Patient/Family, Nurse and     Total time spent with patient: 30 minutes.     Estefania Quispe MD

## 2017-11-07 NOTE — PROGRESS NOTES
Neurology Progress Note     NAME: Kaur Edwarsd   :  1945   MRN:  [id number]   DATE:  2017    Assessment:     Principal Problem:    CVA (cerebral vascular accident) ([address] (2017)      Pt is a 80yo male recently admitted to [facility] on 10/23 with aphasia, nausea and vomiting, found to have multiple left MCA infarcts diagnosed with hypercholesterolemia, and diabetes and left ICA stenosis during that admission. He was started on aspirin 325 mg a day,  Lipitor, Metformin and discharged from rehab on 10/30, presenting 17 with worsened aphasia. MRI brain with new acute infarct on left, predominantly in temporal lobe. CTA of neck with only 50% left ICA stenosis. LDL 42.8. HgbA1C 7.6. Plan:   -Echo pending, if neg, may need DAIN  -Continue ASA   -Consider adding Plavix pending echo results  -Continue Atorvastatin  -PT/OT/ST  -Plan to transfer to floor today    Chart reviewed since last seen  Subjective:   Pt agitated and stating that he wants to leave to get to class. He then perseverates on \"class\" making it difficult to fully understand what he is trying to tell me. He then calls me \"sweetheart\" and tells me he needs to leave. With the RN's assistance, we spoke to his cousin who lives in [place] and is not his POA. She was going to speak with him and try to convince him to stay. She also suggested we call Dr. Derrek Dominguez his PCP.      Objective:     Current Facility-Administered Medications   Medication Dose Route Frequency    atorvastatin (LIPITOR) tablet 20 mg  20 mg Oral QHS    bromocriptine (PARLODEL) tablet 2.5 mg  2.5 mg Oral QHS    sodium chloride (NS) flush 5-10 mL  5-10 mL IntraVENous Q8H    sodium chloride (NS) flush 5-10 mL  5-10 mL IntraVENous PRN    acetaminophen (TYLENOL) tablet 650 mg  650 mg Oral Q4H PRN    Or    acetaminophen (TYLENOL) solution 650 mg  650 mg Per NG tube Q4H PRN    Or    acetaminophen (TYLENOL) suppository 650 mg  650 mg Rectal Q4H PRN    0.9% sodium chloride infusion  75 mL/hr IntraVENous CONTINUOUS    famotidine (PEPCID) tablet 20 mg  20 mg Oral Q12H    glucose chewable tablet 16 g  4 Tab Oral PRN    dextrose (D50W) injection syrg 12.5-25 g  12.5-25 g IntraVENous PRN    glucagon (GLUCAGEN) injection 1 mg  1 mg IntraMUSCular PRN    insulin lispro (HUMALOG) injection   SubCUTAneous Q6H    hydrALAZINE (APRESOLINE) 20 mg/mL injection 20 mg  20 mg IntraVENous Q6H PRN    aspirin (ASPIRIN) tablet 325 mg  325 mg Oral DAILY       Visit Vitals    BP (!) 135/91    Pulse 74    Temp 99.3 °F (37.4 °C)    Resp 19    Wt 88.2 kg (194 lb 7.1 oz)    SpO2 95%     Temp (24hrs), Av.3 °F (36.8 °C), Min:98 °F (36.7 °C), Max:99.3 °F (37.4 °C)      701 - 1900  In: -   Out: 200 [Urine:200]  1901 - 700  In: 1292.5 [P.O.:120; I.V.:1172.5]  Out: 1350 [Urine:1350]      Physical Exam:  General: Well developed well nourished patient in no apparent distress. Cardiac: Regular rate and rhythm with no murmurs. Extremities: 2+ Radial pulses, no cyanosis or edema    Neurological Exam:  Mental Status: Agitated oriented to person, difficulty following commands, spontaneous speech is improved, perseverates   Cranial Nerves:   EOMI, no nystagmus, no ptosis. Facial movement is asymmetric on right.      Motor:     Reflexes:      Sensory:      Gait:     Cerebellar:           Lab Review   Recent Results (from the past 24 hour(s))   GLUCOSE, POC    Collection Time: 17 12:00 PM   Result Value Ref Range    Glucose (POC) 139 (H) 65 - 100 mg/dL    Performed by Edmundo Parker    POC INR    Collection Time: 17 12:02 PM   Result Value Ref Range    INR (POC) 1.1 <1.2     CBC WITH AUTOMATED DIFF    Collection Time: 17 12:05 PM   Result Value Ref Range    WBC 6.8 4.1 - 11.1 K/uL    RBC 4.22 4.10 - 5.70 M/uL HGB 13.6 12.1 - 17.0 g/dL    HCT 39.1 36.6 - 50.3 %    MCV 92.7 80.0 - 99.0 FL    MCH 32.2 26.0 - 34.0 PG    MCHC 34.8 30.0 - 36.5 g/dL    RDW 12.4 11.5 - 14.5 %    PLATELET 220 823 - 688 K/uL    NEUTROPHILS 72 32 - 75 %    LYMPHOCYTES 22 12 - 49 %    MONOCYTES 5 5 - 13 %    EOSINOPHILS 1 0 - 7 %    BASOPHILS 0 0 - 1 %    ABS. NEUTROPHILS 4.9 1.8 - 8.0 K/UL    ABS. LYMPHOCYTES 1.5 0.8 - 3.5 K/UL    ABS. MONOCYTES 0.4 0.0 - 1.0 K/UL    ABS. EOSINOPHILS 0.0 0.0 - 0.4 K/UL    ABS. BASOPHILS 0.0 0.0 - 0.1 K/UL   METABOLIC PANEL, COMPREHENSIVE    Collection Time: 11/06/17 12:05 PM   Result Value Ref Range    Sodium 138 136 - 145 mmol/L    Potassium 4.5 3.5 - 5.1 mmol/L    Chloride 107 97 - 108 mmol/L    CO2 23 21 - 32 mmol/L    Anion gap 8 5 - 15 mmol/L    Glucose 136 (H) 65 - 100 mg/dL    BUN 10 6 - 20 MG/DL    Creatinine 0.95 0.70 - 1.30 MG/DL    BUN/Creatinine ratio 11 (L) 12 - 20      GFR est AA >60 >60 ml/min/1.73m2    GFR est non-AA >60 >60 ml/min/1.73m2    Calcium 8.6 8.5 - 10.1 MG/DL    Bilirubin, total 0.4 0.2 - 1.0 MG/DL    ALT (SGPT) 40 12 - 78 U/L    AST (SGOT) 21 15 - 37 U/L    Alk.  phosphatase 67 45 - 117 U/L    Protein, total 6.8 6.4 - 8.2 g/dL    Albumin 3.4 (L) 3.5 - 5.0 g/dL    Globulin 3.4 2.0 - 4.0 g/dL    A-G Ratio 1.0 (L) 1.1 - 2.2     EKG, 12 LEAD, INITIAL    Collection Time: 11/06/17 12:11 PM   Result Value Ref Range    Ventricular Rate 71 BPM    Atrial Rate 71 BPM    P-R Interval 160 ms    QRS Duration 70 ms    Q-T Interval 372 ms    QTC Calculation (Bezet) 404 ms    Calculated P Axis 32 degrees    Calculated R Axis -6 degrees    Calculated T Axis 11 degrees    Diagnosis       Sinus rhythm with premature atrial complexes  No previous ECGs available  Confirmed by Juan Pascual M.D., Magali Rubio ([id number]) on 11/6/2017 4:44:27 PM     URINALYSIS W/MICROSCOPIC    Collection Time: 11/06/17  1:13 PM   Result Value Ref Range    Color YELLOW/STRAW      Appearance CLEAR CLEAR      Specific gravity 1.014 1.003 - 1.030 pH (UA) 5.0 5.0 - 8.0      Protein NEGATIVE  NEG mg/dL    Glucose NEGATIVE  NEG mg/dL    Ketone NEGATIVE  NEG mg/dL    Bilirubin NEGATIVE  NEG      Blood NEGATIVE  NEG      Urobilinogen 0.2 0.2 - 1.0 EU/dL    Nitrites NEGATIVE  NEG      Leukocyte Esterase NEGATIVE  NEG      WBC 0-4 0 - 4 /hpf    RBC 0-5 0 - 5 /hpf    Epithelial cells FEW FEW /lpf    Bacteria NEGATIVE  NEG /hpf    Hyaline cast 0-2 0 - 5 /lpf   URINE CULTURE HOLD SAMPLE    Collection Time: 11/06/17  1:13 PM   Result Value Ref Range    Urine culture hold URINE ON HOLD IN MICROBIOLOGY DEPT FOR 3 DAYS     GLUCOSE, POC    Collection Time: 11/06/17  6:48 PM   Result Value Ref Range    Glucose (POC) 202 (H) 65 - 100 mg/dL    Performed by Laurence Farley    GLUCOSE, POC    Collection Time: 11/07/17 12:08 AM   Result Value Ref Range    Glucose (POC) 125 (H) 65 - 100 mg/dL    Performed by Lauryn Kinney    LIPID PANEL    Collection Time: 11/07/17  4:52 AM   Result Value Ref Range    LIPID PROFILE          Cholesterol, total 103 <200 MG/DL    Triglyceride 101 <150 MG/DL    HDL Cholesterol 40 MG/DL    LDL, calculated 42.8 0 - 100 MG/DL    VLDL, calculated 20.2 MG/DL    CHOL/HDL Ratio 2.6 0 - 5.0     HEMOGLOBIN A1C WITH EAG    Collection Time: 11/07/17  4:52 AM   Result Value Ref Range    Hemoglobin A1c 7.6 (H) 4.2 - 6.3 %    Est. average glucose 171 mg/dL   CBC W/O DIFF    Collection Time: 11/07/17  4:52 AM   Result Value Ref Range    WBC 6.7 4.1 - 11.1 K/uL    RBC 4.26 4.10 - 5.70 M/uL    HGB 13.9 12.1 - 17.0 g/dL    HCT 39.2 36.6 - 50.3 %    MCV 92.0 80.0 - 99.0 FL    MCH 32.6 26.0 - 34.0 PG    MCHC 35.5 30.0 - 36.5 g/dL    RDW 12.5 11.5 - 14.5 %    PLATELET 609 351 - 452 K/uL   GLUCOSE, POC    Collection Time: 11/07/17  4:56 AM   Result Value Ref Range    Glucose (POC) 141 (H) 65 - 100 mg/dL    Performed by Lauryn Kinney        Additional comments:  I have reviewed the patient's new clinical lab test results.   I have personally reviewed the patient's radiographs. MRI   MRI Results (most recent):    Results from [place] encounter on 11/06/17   MRI BRAIN WO CONT   Narrative Preliminary report: Acute left parietal and temporal infarcts with several foci  of associated acute left parietal intraparenchymal hemorrhage, unchanged  compared to prior CT dated November 6, 2017. Several additional acute left  frontal, parietal and left basal ganglia acute subcentimeter infarcts, not  visualized on prior CT. Final report to follow. EXAM:  MRI BRAIN WO CONT  Clinical history: Speech changes  INDICATION:    Speech changes    COMPARISON:  11/6/2017 CTA. CONTRAST: None    TECHNIQUE:    MR imaging of the brain was performed with sagittal T1, axial T1, T2, FLAIR,  GRE, DWI/ADC; multiplanar T1 images . FINDINGS:   Moderate to large area of acute infarction predominantly in the left parietal  and temporal and lobes. Additional scattered foci of infarction in the  periventricular white matter on the left. No right-sided infarctions are  demonstrated. Punctate foci of hemorrhage are associated with the infarction in  the left parietal lobe. There is no Chiari or syrinx. There are degenerative changes in the upper  cervical spine with canal stenosis at C3-C4. Pituitary infundibulum  unremarkable. . No midline shift or mass effect. Minimal mucoperiosteal  thickening in the maxillary sinuses greater on the left. . Cavernous sinuses are  symmetric. Swanson Junk Normal appearing flow-voids are present in the vertebral, basilar  and carotid artery systems. The craniocervical junction is normal.  The  structures at the cranial base including paranasal sinuses and mastoid air cells  are unremarkable. Impression IMPRESSION:      Moderate to large area of infarction predominantly in the left parietal and left  temporal lobes with minimal associated superimposed hemorrhage in the left  parietal lobe.  There are small punctate foci of infarction in the  periventricular white matter on the left as well. There is no midline shift or mass effect. Care Plan discussed with:  Patient x   Family x   RN x   Care Manager    Consultant/Specialist:       Signed: Alayna Palm MD

## 2017-11-07 NOTE — PROGRESS NOTES
Physical Therapy Note    Order acknowledged and chart reviewed. Spoke with RN who reports pt is currently agitated and unable to appropriately participate in skilled PT evaluation. Will plan to follow up later this PM as appropriate.     Thank you,  Arianne Benitez, PT, DPT

## 2017-11-07 NOTE — PROGRESS NOTES
Problem: Mobility Impaired (Adult and Pediatric)  Goal: *Acute Goals and Plan of Care (Insert Text)  Physical Therapy Goals  Initiated 11/7/2017  1. Patient will move from supine to sit and sit to supine  and scoot up and down in bed with independence within 7 day(s). 2.  Patient will transfer from bed to chair and chair to bed with independence using the least restrictive device within 7 day(s). 3.  Patient will perform sit to stand with independence within 7 day(s). 4.  Patient will ambulate with independence for 400 feet with the least restrictive device within 7 day(s). 5.  Patient will improve Ruff Balance score by 7 points within 7 days. physical Therapy EVALUATION- neuro population    Patient: Kari Cui (79 y.o. male)  Date: 11/7/2017  Primary Diagnosis: ICH (intracerebral hemorrhage) (HCC)        Precautions:        ASSESSMENT :  Based on the objective data described below, the patient presents with impaired functional mobility as compared to baseline level 2* emotional lability (very agitated this AM), impulsiveness and poor safety awareness leading to impaired gait and stability following admission for new acute ICH. Some expressive aphasia noted throughout session, session limited by transferring to [facility] for routine progression of care. He was able to complete sit<>stands and ambulate hallway with up to CGA and cues to slow pace for safety - mild L sided path drifts and ?inattention noted. Anticipate that he his near his baseline level and may be appropriate for discharge home with family assist and HHPT pending progress and verification of PLOF details. Will plan for follow up for 1-2 additional visits to ensure safety and consistency with mobility. Patient will benefit from skilled intervention to address the above impairments.   Patients rehabilitation potential is considered to be Good  Factors which may influence rehabilitation potential include:   []           None noted  [x] Mental ability/status  []           Medical condition  []           Home/family situation and support systems  [x]           Safety awareness  []           Pain tolerance/management  []           Other:      PLAN :  Recommendations and Planned Interventions:  [x]             Bed Mobility Training             [x]      Neuromuscular Re-Education  [x]             Transfer Training                   []      Orthotic/Prosthetic Training  [x]             Gait Training                         []      Modalities  [x]             Therapeutic Exercises           []      Edema Management/Control  [x]             Therapeutic Activities            [x]      Patient and Family Training/Education  []             Other (comment):  Frequency/Duration: Patient will be followed by physical therapy 3 times a week to address goals. Discharge Recommendations: Home Health  Further Equipment Recommendations for Discharge: none anticipated     SUBJECTIVE:   Patient stated I have to pee.     OBJECTIVE DATA SUMMARY:   HISTORY:    Past Medical History:   Diagnosis Date    Diabetes ([facility] [address]     Hyperlipemia    History reviewed. No pertinent surgical history. Prior Level of Function/Home Situation: PLOF details need to be verified. Recently discharge from rehab following prior CVA. Personal factors and/or comorbidities impacting plan of care:     EXAMINATION/PRESENTATION/DECISION MAKING:   Critical Behavior:  Neurologic State: Alert  Orientation Level: Oriented to person  Cognition: Impulsive, Follows commands, Impaired decision making  Safety/Judgement: Awareness of environment, Decreased awareness of need for assistance, Decreased awareness of need for safety  Hearing:     Skin:  Intact where exposed  Edema: none noted  Range Of Motion:  AROM: Within functional limits  PROM: Within functional limits        Strength:    Strength:  Within functional limits      Tone & Sensation:   Tone: Normal   Sensation: Intact Coordination:  Coordination: Generally decreased, functional  Vision:   Tracking: Able to track stimulus in all quadrants w/o difficulty  Acuity: Within Defined Limits  Corrective Lenses: Reading glasses  Functional Mobility:  Bed Mobility:     Supine to Sit:  (Received seated in recliner)     Transfers:  Sit to Stand: Contact guard assistance  Stand to Sit: Supervision     Balance:   Sitting: Intact  Standing: Impaired  Standing - Static: Good  Standing - Dynamic : Fair  Ambulation/Gait Training:  Distance (ft): 250 Feet (ft)  Assistive Device: Gait belt  Ambulation - Level of Assistance: Contact guard assistance  Gait Description (WDL): Exceptions to WDL  Gait Abnormalities: Path deviations;Trunk sway increased  Base of Support: Narrowed  Speed/Leonila: Fluctuations  Step Length: Right shortened;Left shortened       Functional Measure  Ruff Balance Test:    Sitting to Standing: 3  Standing Unsupported: 2  Sitting with Back Unsupported: 4  Standing to Sitting: 3  Transfers: 2  Standing Unsupported with Eyes Closed: 0  Standing Unsupported with Feet Together: 0  Reach Forward with Outstretched Arm: 2   Object: 0  Turn to Look Over Shoulders: 2  Turn 360 Degrees: 1  Alternate Foot on Step/Stool: 0  Standing Unsupported One Foot in Front: 0  Stand on One Le  Total: 19         56=Maximum possible score;   0-20=High fall risk  21-40=Moderate fall risk   41-56=Low fall risk     Ruff Balance Test and G-code impairment scale:  Percentage of Impairment CH    0%   CI    1-19% CJ    20-39% CK    40-59% CL    60-79% CM    80-99% CN     100%   Ruff   Score 0-56 56 45-55 34-44 23-33 12-22 1-11 0       G codes: In compliance with CMSs Claims Based Outcome Reporting, the following G-code set was chosen for this patient based on their primary functional limitation being treated:     The outcome measure chosen to determine the severity of the functional limitation was the Juares with a score of 19/56 which was correlated with the impairment scale. ? Mobility - Walking and Moving Around:     - CURRENT STATUS: CL - 60%-79% impaired, limited or restricted    - GOAL STATUS: CK - 40%-59% impaired, limited or restricted    - D/C STATUS:  ---------------To be determined---------------     Physical Therapy Evaluation Charge Determination   History Examination Presentation Decision-Making   HIGH Complexity :3+ comorbidities / personal factors will impact the outcome/ POC  MEDIUM Complexity : 3 Standardized tests and measures addressing body structure, function, activity limitation and / or participation in recreation  LOW Complexity : Stable, uncomplicated  HIGH Complexity : FOTO score of 1- 25       Based on the above components, the patient evaluation is determined to be of the following complexity level: LOW       Pain:  Pain Scale 1: Numeric (0 - 10)  Pain Intensity 1: 0         Activity Tolerance:   NAD  Please refer to the flowsheet for vital signs taken during this treatment. After treatment:   [x]     Patient left in no apparent distress sitting up in wheelchair to transport to [facility] with RN  []     Patient left in no apparent distress in bed  []     Call bell left within reach  [x]     Nursing notified  []     Caregiver present  []     Bed alarm activated    COMMUNICATION/EDUCATION:   The patients plan of care was discussed with: Occupational Therapist and Registered Nurse. Patient was educated regarding His deficit(s) of aphasia as this relates to His diagnosis of ICH. He demonstrated Good understanding as evidenced by nodding. Patient and/or family was verbally educated on the BE FAST acronym for signs/symptoms of CVA and TIA. All questions answered with patient indicating good understanding. [x]  Fall prevention education was provided and the patient/caregiver indicated understanding. [x]  Patient/family have participated as able in goal setting and plan of care.   [x]  Patient/family agree to work toward stated goals and plan of care. []  Patient understands intent and goals of therapy, but is neutral about his/her participation. []  Patient is unable to participate in goal setting and plan of care.     Thank you for this referral.  Kel Eason, PT , DPT   Time Calculation: 12 mins

## 2017-11-07 NOTE — PROGRESS NOTES
Cm reviewed chart and noted that patient had a stoke on 10/19 17 and admitted to [facility].  He stayed int  hospital until 10/23/17 and discharged to [place] [address].. He was discharged home 10/31/17 with home health services- [address] [place] [address]. Chart indicates that the SW visited patient 11/6/17 and found patient confused and aphasia. He was brought to ER and admitted with intracranial hemorrhage . No AMD in chart. No PCP listed. The only emergency number is a friend-- Denise Garcia [phone]. Chart indicates patient works at [employer]. CM met with patient and Dr Tiffany Ferrer in patient's room to introduce self and explain role. Patient was alert but confused. It was difficult to understand him. He did confirm that he lives alone in an apartment. He said he has been receiving some therapy at home. He said he has no family in the area. Cm talked with Missy Gomez at [facility] EMRE TUCKER--[phone]--[facility] was able to inform CM that patient has [employer]  [id number]  [phone]). She confirmed dates of inpatient rehab--10/23/17/-10/31/17and that  home health PT/OT Nursing and SW was arranged with [address] [place] [address]. CM talked with ritchie [phone]  at the agency and was told that the SW visited patient yesterday (11/6/17). Patient's PCP is Melba Witt-- he has signed the treatment plan for [facility] for patient. CM placed Dr HERNÁNDEZ [facility] name in [facility]. CM sent email to resource center Carlos Willard)  and MIRANDA Simpson) regarding patient's insurance providing insurance number and telephone number. At this time transition of care needs not determined. Therapy will evaluate and make recommendations (PT/OT and speech). Cm will follow and make referrals for rehab/ [facility] as ordered    Care Management Interventions  PCP Verified by CM:  Yes  Mode of Transport at Discharge:  (TBD)  Transition of Care Consult (CM Consult): Discharge Planning  Physical Therapy Consult: Yes  Occupational Therapy Consult: Yes  Speech Therapy Consult: Yes  Current Support Network: Lives Alone (lives alone in apartment   working until last admission with CVA.   No AMD)  Confirm Follow Up Transport: Friends  Plan discussed with Pt/Family/Caregiver: Yes  Freedom of Choice Offered: Yes  Discharge Location  Discharge Placement:  (TBD  HH/rehab)

## 2017-11-07 NOTE — PROGRESS NOTES
Speech pathology  Orders received, attempted to see patient for language evaluation; however, upon entering room, patient agitated, stating that \"nobody is understanding, he wants to leave if nobody is going to do anything and that he had to go to the bathroom\". RN in to assist with the restroom. Will f/u later today for language eval. RN reports no difficulty swallowing. He passed STAND is on a regular diet. Dixie Baker M.S., CCC-SLP    15:18 Re-attempted to see for language evaluation but patient was working with OT/PT and then going down to [facility]. Will f/u tomorrow.  Dixie Baker M.S., CCC-SLP

## 2017-11-07 NOTE — PROGRESS NOTES
Problem: Self Care Deficits Care Plan (Adult)  Goal: *Acute Goals and Plan of Care (Insert Text)  Occupational Therapy Goals  Initiated 11/7/2017  1. Patient will perform upper body ADLs standing 5 mins without fatigue or LOB with independence within 7 day(s). 2.  Patient will participate in upper extremity therapeutic exercise/activities with independence for 10 minutes within 7 day(s). 3.  Patient will utilize energy conservation and good SAFETY awareness techniques during functional activities without cues within 7 day(s). Occupational Therapy EVALUATION  Patient: Nita Loving (19 y.o. male)  Date: 11/7/2017  Primary Diagnosis: ICH (intracerebral hemorrhage) ([facility] [address]        Precautions: Fall       ASSESSMENT :  Based on the objective data described below, the patient presents with overall CGA-SBA for functional mobility and ADLs s/p ICH. Patient received seated in chair, per RN preparing for transfer to [facility]. Patient demonstrating full and intact ROM, strength, and coordination in [address] [place] [address]. Primary limitations related to emotional lability (extremely agitated this AM with word finding difficulties and hospitalization), decreased safety awareness, impulsivity, and mildly decreased balance. Patient appears to be close to his baseline, recently discharged from inpatient rehab on 10/31/2017. Patient does not have family in the area. Will follow up 1-2 more times during admission to attempt safety and energy conservation education/training. Recommend HHOT at discharge to ensure safe patient environment. Patient will benefit from skilled intervention to address the above impairments.   Patients rehabilitation potential is considered to be Good  Factors which may influence rehabilitation potential include:   []             None noted  []             Mental ability/status  []             Medical condition  []             Home/family situation and support systems  [x]             Safety awareness  []             Pain tolerance/management  []             Other:      PLAN :  Recommendations and Planned Interventions:  []               Self Care Training                  [x]        Therapeutic Activities  []               Functional Mobility Training    [x]        Cognitive Retraining  [x]               Therapeutic Exercises           []        Endurance Activities  [x]               Balance Training                   []        Neuromuscular Re-Education  []               Visual/Perceptual Training     [x]   Home Safety Training  [x]               Patient Education                 [x]        Family Training/Education  []               Other (comment):    Frequency/Duration: Patient will be followed by occupational therapy 1 time a week to address goals. Discharge Recommendations: [address] [place] [address] for home safety evaluation  Further Equipment Recommendations for Discharge: Anticipate no needs     SUBJECTIVE:   Patient stated Don't forget my class!  Patient indicating to cell phone    OBJECTIVE DATA SUMMARY:   HISTORY:   Past Medical History:   Diagnosis Date    Diabetes ([facility] [address]     Hyperlipemia    History reviewed. No pertinent surgical history. Prior Level of Function/Environment/Context: Per chart review, patient lives at home alone. Does not have family, is estranged from them. Completing ADLs/IADLs independently. Expanded or extensive additional review of patient history:        []  Right hand dominant   []  Left hand dominant    EXAMINATION OF PERFORMANCE DEFICITS:  Cognitive/Behavioral Status:  Neurologic State: Alert  Orientation Level: Oriented to person;Oriented to place  Cognition: Follows commands;Decreased attention/concentration; Impulsive;Poor safety awareness  Perception: Appears intact  Perseveration: No perseveration noted  Safety/Judgement: Fall prevention;Decreased awareness of need for assistance    Skin: Appears intact    Edema: None noted in BUEs    Hearing:       Vision/Perceptual:    Tracking: Able to track stimulus in all quadrants w/o difficulty                      Acuity: Within Defined Limits    Corrective Lenses: Reading glasses    Range of Motion:  AROM: Within functional limits  PROM: Within functional limits                      Strength:  Strength: Within functional limits                Coordination:  Coordination: Within functional limits  Fine Motor Skills-Upper: Left Intact; Right Intact    Gross Motor Skills-Upper: Left Intact; Right Intact    Tone & Sensation:  Tone: Normal  Sensation: Intact                      Balance:  Sitting: Intact  Standing: Impaired  Standing - Static: Good  Standing - Dynamic : Fair    Functional Mobility and Transfers for ADLs:  Bed Mobility:  Supine to Sit:  (Received seated in recliner)    Transfers:  Sit to Stand: Contact guard assistance  Stand to Sit: Supervision  Toilet Transfer : Supervision    ADL Assessment:  Feeding: Independent    Oral Facial Hygiene/Grooming: Independent    Bathing: Supervision    Upper Body Dressing: Independent    Lower Body Dressing: Supervision    Toileting: Supervision         * Inferred per obs of functional mobility, activity tolerance, patient endurance, and safety awareness       ADL Intervention and task modifications:    Lower Body Dressing Assistance  Underpants:  Independent  Socks: Supervision/set-up  Leg Crossed Method Used: Yes  Position Performed: Seated in chair    Toileting  Bladder Hygiene: Supervision/set-up  Bowel Hygiene: Supervision/set-up  Clothing Management: Supervision/set-up    Cognitive Retraining  Safety/Judgement: Fall prevention;Decreased awareness of need for assistance    Functional Measure:  Barthel Index:    Bathin  Bladder: 10  Bowels: 10  Groomin  Dressing: 10  Feeding: 10  Mobility: 10  Stairs: 0  Toilet Use: 10  Transfer (Bed to Chair and Back): 10  Total: 75       Barthel and G-code impairment scale:  Percentage of impairment CH  0% CI  1-19% CJ  20-39% CK  40-59% CL  60-79% CM  80-99% CN  100% Barthel Score 0-100 100 99-80 79-60 59-40 20-39 1-19   0   Barthel Score 0-20 20 17-19 13-16 9-12 5-8 1-4 0      The Barthel ADL Index: Guidelines  1. The index should be used as a record of what a patient does, not as a record of what a patient could do. 2. The main aim is to establish degree of independence from any help, physical or verbal, however minor and for whatever reason. 3. The need for supervision renders the patient not independent. 4. A patient's performance should be established using the best available evidence. Asking the patient, friends/relatives and nurses are the usual sources, but direct observation and common sense are also important. However direct testing is not needed. 5. Usually the patient's performance over the preceding 24-48 hours is important, but occasionally longer periods will be relevant. 6. Middle categories imply that the patient supplies over 50 per cent of the effort. 7. Use of aids to be independent is allowed. Avis Delaney., Barthel, D.W. (0896). Functional evaluation: the Barthel Index. [facility] (14)2. TOMI VillaseñorF, Daljit Newberry., Juanjose Jimenez., [place], [address] [facility] (1999). Measuring the change indisability after inpatient rehabilitation; comparison of the responsiveness of the Barthel Index and Functional [place] Measure. Journal of Neurology, Neurosurgery, and Psychiatry, 66(4), 510-351. Allen Perez NTAMMY.ORACIO, MARIO DallasJ.CHEL, & Alma Becerra M.A. (2004.) Assessment of post-stroke quality of life in cost-effectiveness studies: The usefulness of the Barthel Index and the EuroQoL-5D. Quality of Life Research, 13, 100-78         G codes: In compliance with CMSs Claims Based Outcome Reporting, the following G-code set was chosen for this patient based on their primary functional limitation being treated:     The outcome measure chosen to determine the severity of the functional limitation was the Barthel Index with a score of 75/100 which was correlated with the impairment scale. ? Self Care:     - CURRENT STATUS: CJ - 20%-39% impaired, limited or restricted    - GOAL STATUS: CI - 1%-19% impaired, limited or restricted    - D/C STATUS:  ---------------To be determined---------------     Occupational Therapy Evaluation Charge Determination   History Examination Decision-Making   LOW Complexity : Brief history review  LOW Complexity : 1-3 performance deficits relating to physical, cognitive , or psychosocial skils that result in activity limitations and / or participation restrictions  MEDIUM Complexity : Patient may present with comorbidities that affect occupational performnce. Miniml to moderate modification of tasks or assistance (eg, physical or verbal ) with assesment(s) is necessary to enable patient to complete evaluation       Based on the above components, the patient evaluation is determined to be of the following complexity level: LOW   Pain:  Pain Scale 1: Numeric (0 - 10)  Pain Intensity 1: 0              Activity Tolerance:   Good. Please refer to the flowsheet for vital signs taken during this treatment. After treatment:   [x] Patient left in no apparent distress sitting up in chair  [] Patient left in no apparent distress in bed  [x] Call bell left within reach  [x] Nursing notified  [x] Caregiver present  [] Bed alarm activated    COMMUNICATION/EDUCATION:   The patients plan of care was discussed with: Physical Therapist and Registered Nurse.  [] Home safety education was provided and the patient/caregiver indicated understanding. [x] Patient/family have participated as able in goal setting and plan of care. [] Patient/family agree to work toward stated goals and plan of care. [] Patient understands intent and goals of therapy, but is neutral about his/her participation. [] Patient is unable to participate in goal setting and plan of care. This patients plan of care is appropriate for delegation to [facility].     Thank you for this referral.  Amish Gross, OT  Time Calculation: 12 mins

## 2017-11-07 NOTE — PROGRESS NOTES
1924 Bedside and Verbal shift change report given to Josette Wallis RN  (oncoming nurse) by  Annemarie Lechuga RN (offgoing nurse). Report included the following information SBAR, Kardex, ED Summary, Intake/Output, MAR, Accordion, Recent Results, Med Rec Status, Cardiac Rhythm normal sinus  and Alarm Parameters . Friends at bedside at this time  2000 Assessment done at this time, patient is noted to be aphasic, expressive, no further neuro deficits noted. Patient is watching TV he denies any pain. 2030 Dr Alicia Edge here to see patient, no new orders received at this time  2050 Patient to MRI at this time, no distress  2145 Patient back from MRI, no distress at this time. Patient back to bed  0000 Reassessment done at this time , no distress noted at this time, aphasia still present  0200 Patient sleeping, no distress noted  0400 Reassessment done at this time, no changes noted. 0500 Labs drawn at this, glucose checked at 141, patient is due 2 units of insulin, he refused he stated that was not one of his medications ,nurse attempted to instruct patient on insulin, but patient refused the injection at this time  0730 Bedside and Verbal shift change report given to [address] [place] [address]  (oncoming nurse) by Josette Wallis RN  (offgoing nurse). Report included the following information SBAR, Kardex, MAR, Accordion, Recent Results, Med Rec Status, Cardiac Rhythm normal sinus  and Alarm Parameters .

## 2017-11-07 NOTE — CONSULTS
[address] [facility] [address] [facility]   [address] [facility]       Name:  Keyanna Clark   MR#:  [id number]   :  1945   Account #:  [de-identified]    Date of Consultation:  2017   Date of Adm:  2017       DATE OF CONSULTATION: 2017. REASON FOR CONSULTATION: Intracranial hemorrhage. HISTORY OF PRESENT ILLNESS: This is a 59-year-old gentleman   who had a stroke in the left parietal occipital region. This was on   2017. He was admitted to [facility]. At that time, he   apparently presented with dizziness and vision changes. Workup there   included an MRI that showed his area of stroke. He improved and went to rehab on   2017. He was just discharged from rehabilitation on   2017. Today, a  visited him at   home and was concerned because the patient had increased   confusion and increased aphasia. He was only able to say yes or no. Medical records say that he did become worse during transit to the   emergency room with some question of right upper extremity   weakness. Workup included a head CT that showed a small area of   punctate hemorrhage within the area of his previous stroke. He was   admitted to the ICU. Neurology has been consulted. He was admitted   to the hospitalist service. PAST MEDICAL HISTORY: Diabetes, hypercholesterolemia. PAST SURGICAL HISTORY: Unknown. CURRENT MEDICATIONS    1. Aspirin 325 mg.   2. Lipitor 20 mg.   3. Bromocriptine 2.5 mg at bedtime. 4. Pepcid 20 mg every day. 5. Sliding scale insulin. ALLERGIES: NO KNOWN DRUG ALLERGIES. FAMILY HISTORY: Unknown. SOCIAL HISTORY: He lives alone. REVIEW OF SYSTEMS: Unobtainable. PHYSICAL EXAMINATION   VITAL SIGNS: Afebrile, blood pressure 131/90, pulse 75. GENERAL: This is a well-developed, well-nourished gentleman who is   examined lying in the bed. NEUROLOGIC: He is alert.  He has a conjugate gaze and a symmetric   face. He has an expressive aphasia. He is able to give me some parts   of his medical history, but it is difficult to interpret. He is able to follow   commands. He has no pronator drift. There is full strength in bilateral   upper extremities, full strength in bilateral lower extremities. Sensory is   grossly intact. IMAGING STUDIES: He has a head CT that shows encephalomalacia,   consistent with a stroke in the left parietal occipital region. There is a   small punctate hemorrhage. ASSESSMENT AND PLAN: This is a 50-year-old gentleman who has   had a stroke 2 weeks ago, who presents with a transient episode of   increased confusion and increased aphasia that seems to have   Resolved and he is now at his baseline. He is scheduled to get an MRI. Neurology is following. We   will repeat his head CT in the morning to rule out any expansion of the   punctate hemorrhage. Thank you for this consultation.         MD GABRIELLE Barry / Saeed Steel   D:  11/06/2017   20:47   T:  11/07/2017   02:15   Job #:  [id number]

## 2017-11-07 NOTE — CDMP QUERY
Please clarify if this patient is being treated/managed for:    =>Encephalopathy in the setting of CVA requiring neurology consult/high fall risk precautions  =>Other Explanation of clinical findings  =>Unable to Determine (no explanation of clinical findings)    The medical record reflects the following clinical findings, treatment, and risk factors:    Risk Factors: CVA  Clinical Indicators:ED-Upon EMS arrival, pt was able to follow simple commands and reply yes and no appropriately. However, pt's condition worsened en route as he became less able to follow commands with some possible right arm drift. [facility] [place] that the pt spoke on the phone to his friend who noticed some level of confusion but was not concerned - last known well is unknown   H/P- got concerned because the patient was confused and was having expressive as well as receptive aphasia  Treatment: neurology consult/high fall risk precautions    Please clarify and document your clinical opinion in the progress notes and discharge summary including the definitive and/or presumptive diagnosis, (suspected or probable), related to the above clinical findings. Please include clinical findings supporting your diagnosis.     Thank you,         Uyen Ortega [facility]

## 2017-11-07 NOTE — H&P
[address] [facility] [address]   HISTORY AND PHYSICAL       Name:  Twila Giordano   MR#:  [id number]   :  1945   Account #:  [de-identified]        Date of Adm:  2017       CHIEF COMPLAINT: Aphasia. HISTORY OF PRESENT ILLNESS: The patient is a 73-year-old   gentleman with past medical history of diabetes and hyperlipidemia,   who presents to the hospital complaining of aphasia. History was    suboptimal as patient has aphasia and does not answer most of my   questions. History was relied upon from the chart as well as very   limited conversation with the patient. The patient apparently had his    visit today at his home and the  got   concerned because the patient was confused and was having   expressive as well as receptive aphasia. EMS was called. On EMS   arrival, the patient was able to follow simple commands and replied   yes and no appropriately; however, the patient's condition worsened en   route and he became less able to follow commands with some   possible right arm drift. EMS notes also state that the patient spoke to   the friend on his phone and noticed some level of confusion, but was   not concerned, last known well was . Currently, the patient is lying in   bed, is quite aphasic, both having receptive as well as expressive   aphasia, keeps saying \"when all this will get over\" to all my questions,   but nods indicating that he does not have any headache, blurry vision,   sore throat, trouble swallowing, any chest pain, any lightheadedness,   dizziness, abdominal pain, constipation, diarrhea, urinary symptoms,   any recent falls, injuries or any other concerns or problems. PAST MEDICAL HISTORY: See above. HOME MEDICATIONS: Currently the patient's home medication list   states that he is on:   1. Bromocriptine 2.5 mg nightly. 2. Lipitor 20 mg nightly. 3. Metformin 500 mg b.i.d.     SOCIAL HISTORY: Could not be obtained from the patient. FAMILY HISTORY: Could not be obtained from the patient. REVIEW OF SYSTEMS: Suboptimal, but patient denies any other   complaints or problems besides the symptoms mentioned above on a   further review of systems. PHYSICAL EXAMINATION:   VITAL SIGNS: Temperature 98, pulse 69, respiratory rate 17, blood   pressure 150/82, pulse oximetry 100% on room air. GENERAL: Aphasic. HEENT: Pupils equal and reactive to light. Dry mucous membranes. NECK: Supple. CHEST: Clear to auscultation bilaterally. CORONARY: S1, S2 were heard. ABDOMEN: Soft, nontender, nondistended. Bowel sounds are   physiologic. EXTREMITIES: No clubbing, no cyanosis, no edema. NEUROPSYCHIATRIC: DTR 2+/4. Strength 5/5 into 4. Sensory   appears to be grossly within normal limits. Cranial nerves 2-12 appear   to be grossly intact. SKIN: Warm. LABORATORY DATA: White count 6.8, hemoglobin 13.6, hematocrit   39.1, platelets 217. Urine shows no signs of infection. Sodium 138,   potassium , chloride 107, bicarbonate 23, anion gap 8, glucose 136,   BUN , creatinine 0.95, calcium 8.6, bilirubin total 0.48, ALT 40, AST   21, alkaline phosphatase 67. CT of the head shows an area of   ischemic change in the left parietal cortex with punctate hemorrhages   within it, age of area of ischemia is indeterminate and may be   subacute. The CTA that was done shows  parietal acute/subacute   infarct with associated acute intraparenchymal hemorrhage,   unchanged compared to prior CT on November 6th. ASSESSMENT AND PLAN:   1. Cerebrovascular accident with  intracranial hemorrhage. The   patient will be admitted to the hospital. I spoke with Dr. Kristan Saldivar from   Neurosurgery and she recommends no antiplatelet or anticoagulation   for the next 24 hours. Will repeat a CT in the morning. Dr. Kristan Saldivar is   not planning to do any acute intervention tonight.  Will optimize blood   pressure control to keep systolic less than 657 as recommended by   Neurosurgery. Will closely monitor. Any changes in neurological status   will mandate emergent intervention. MRI of the brain has been   ordered. Neurology consult has been requested. Further intervention   will be per hospital course. Will reassess as needed and continue to   monitor. The patient is at high risk for decompensation secondary to   his status and records will be obtained from  hospital.   2. History of diabetes. The patient will be on sliding scale NovoLog   insulin, Accu-Cheks, diet control. I will hold the metformin  tonight. Further intervention will be per hospital course. 3. History of hyperlipidemia. Continue statin. 4. Gastrointestinal and deep vein thrombosis prophylaxis. The patient   will be on sequential compression devices.         MD Shree Sanchez [place] / [employer] [facility]   D:  11/06/2017   14:55   T:  11/06/2017   15:38   Job #:  [id number]

## 2017-11-07 NOTE — PROGRESS NOTES
Occupational Therapy Note [id number]    Orders acknowledged, chart reviewed. Per RN patient currently agitated and unable to appropriately participate in OT evaluation at this time. Will follow up later this afternoon. Thank you.     Waylon Lopez OTR/L

## 2017-11-07 NOTE — ROUTINE PROCESS
TRANSFER - OUT REPORT:    Verbal report given to [facility] RN(name) on Franco Adam  being transferred to NSTU(unit) for routine progression of care       Report consisted of patients Situation, Background, Assessment and   Recommendations(SBAR). Information from the following report(s) SBAR, Kardex, ED Summary, Intake/Output, MAR, Accordion and Recent Results was reviewed with the receiving nurse. Lines:   Peripheral IV 11/06/17 Left Antecubital (Active)   Site Assessment Clean, dry, & intact 11/7/2017 12:00 PM   Phlebitis Assessment 0 11/7/2017 12:00 PM   Infiltration Assessment 0 11/7/2017 12:00 PM   Dressing Status Clean, dry, & intact 11/7/2017 12:00 PM   Dressing Type Transparent;Tape 11/7/2017 12:00 PM   Hub Color/Line Status Pink 11/7/2017 12:00 PM   Action Taken Open ports on tubing capped 11/7/2017 12:00 PM   Alcohol Cap Used Yes 11/7/2017 12:00 PM       Peripheral IV 11/06/17 Right Antecubital (Active)   Site Assessment Clean, dry, & intact 11/7/2017 12:00 PM   Phlebitis Assessment 0 11/7/2017 12:00 PM   Infiltration Assessment 0 11/7/2017 12:00 PM   Dressing Status Clean, dry, & intact 11/7/2017 12:00 PM   Dressing Type Transparent;Tape 11/7/2017 12:00 PM   Hub Color/Line Status Pink 11/7/2017 12:00 PM   Action Taken Open ports on tubing capped 11/7/2017 12:00 PM   Alcohol Cap Used Yes 11/7/2017 12:00 PM        Opportunity for questions and clarification was provided.       Patient transported with:

## 2017-11-07 NOTE — PROGRESS NOTES
Problem: Falls - Risk of  Goal: *Absence of Falls  Document Sunny Fall Risk and appropriate interventions in the flowsheet.    Outcome: Progressing Towards Goal  Fall Risk Interventions:  Mobility Interventions: Bed/chair exit alarm, Communicate number of staff needed for ambulation/transfer, Patient to call before getting OOB, PT Consult for mobility concerns  Mentation Interventions: Bed/chair exit alarm, Adequate sleep, hydration, pain control, Evaluate medications/consider consulting pharmacy, Door open when patient unattended, Increase mobility, More frequent rounding, Reorient patient  Medication Interventions: Evaluate medications/consider consulting pharmacy, Patient to call before getting OOB  Elimination Interventions: Bed/chair exit alarm, Call light in reach, Elevated toilet seat, Patient to call for help with toileting needs  Fall precautions are in place and patient verbalizes understanding of fall precautions             Problem: TIA/CVA Stroke: 0-24 hours  Goal: *Hemodynamically stable  Outcome: Progressing Towards Goal  Hgb is 11.9  Goal: *Neurologically stable  Absence of additional neurological deficits     Outcome: Not Progressing Towards Goal  Expressive aphasia present  Goal: *Verbalizes anxiety and depression are reduced or absent  Outcome: Not Progressing Towards Goal  Patient does get frustrated at times when he is unable to communicate due to the expressive aphasia   Goal: *Stroke education started(Stroke Metric)  Outcome: Progressing Towards Goal  Stroke booklet given to patient   Goal: *Dysphagia screen performed(Stroke Metric)  Outcome: Progressing Towards Goal  Patient passed STAND    Problem: Pressure Injury - Risk of  Goal: *Prevention of pressure ulcer  Outcome: Progressing Towards Goal  Patient able to turned and reposition self

## 2017-11-07 NOTE — CONSULTS
[address] [facility] [address]   [address] [facility]       Name:  Elena Cerna   MR#:  [id number]   :  1945   Account #:  [de-identified]    Date of Consultation:  11/10/2017   Date of Adm:  2017       HISTORY OF PRESENT ILLNESS: This is a 71-year-old gentleman   who presents with aphasia. The patient is only able to provide limited   history due to his deficit. He was recently discharged from [facility] after a stroke. The majority of this history is obtained   from the EMR. He was admitted to [facility]   on the  with aphasia, nausea and vomiting and subsequently   found to have multifocal acute infarcts in the left MCA distribution with   a relatively large wedge-shaped infarct in the left temporoparietal   region. His workup there revealed hypercholesterolemia. He was   started on Lipitor 20 mg a day, diabetes, started on metformin 500 mg   b.i.d. and he was started on aspirin 325 mg a day. His carotid Dopplers   revealed left ICA stenosis, 50-79%. It was noted prior to this   admission, he had no past medical history and was not taking   medications at home. He underwent CT of the head on presentation   here, which reveals hypoattenuation in the left temporoparietal region   in the region of his known prior stroke with a tiny punctate hemorrhage   in this region. The patient attempts to communicate and is obviously   frustrated by his aphasia. He is able to state that his symptoms got   better and now this, now worse. He was discharged from [facility]   rehab on 10/30/2017. CTA of the head and neck reveals mild stenosis   in the left ICA approximately 50% and M3-vessel occlusion on the left   consistent with his known infarct. PAST MEDICAL HISTORY: Hypercholesterolemia, diagnosed in   October, diabetes diagnosed in October, left ICA stenosis 50-  79% found in October. No other known past medical history.  Unable to   obtain additional history from the patient. REVIEW OF SYSTEMS: Cannot be performed secondary to the   patient's aphasia. MEDICATIONS   According to his discharge note from rehab include:   1. Aspirin 325 mg a day. 2. Lipitor 20 mg a day. 3. Metformin 500 mg b.i.d.   4. Bromocriptine 2.5 mg at bedtime. ALLERGIES: ALLERGIES NONE. SOCIAL HISTORY: He lives alone. 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 No tobacco or drug use reported on his   admission to [facility] and rare wine. FAMILY HISTORY: Mom with stroke. Dad with history of cancer. PHYSICAL EXAMINATION   VITAL SIGNS: Blood pressure 120/82, pulse 74, respiratory rate 16,   saturating 98% on room air, temperature is 98.1. GENERAL: He is a well-nourished, well-developed, otherwise, healthy-  appearing male sitting in bed in no distress. HEART: Has a regular rhythm without murmurs. His carotids are 2+   and no bruit appreciated, but it was difficult to auscultate over breath   sounds and the patient did not hold his breath for me. EXTREMITIES: Warm. He had 2+ radial pulses. No edema. NEUROLOGIC: MENTAL STATUS: He was alert. He has clear   expressive aphasia. He was unable to name or repeat. He was able to   follow most simple commands. No dysarthria. Cranial nerves: No   asymmetry or ptosis. Extraocular eye movements intact. Pupils round   and reactive. Palate elevated symmetrically. Motor exam appeared to   be 5/5 throughout. No pronator drift. No tremors. Sensory exam was   unable to be performed due to his aphasia. reflexes were 2+ and   symmetric. His toes were downgoing. Coordination was intact to finger-  to-nose bilaterally. Heel-to-shin not attempted due to difficulties with   complex commands. STUDIES AND REPORTS: CBC here is unremarkable. UA   unremarkable. Glucose 136.     ASSESSMENT AND PLAN: This is a 52-year-old gentleman recently   admitted to [facility] on 10/23 with aphasia,   nausea and vomiting, found to have multiple left MCA infarcts   diagnosed with hypercholesterolemia, and diabetes and left ICA   stenosis during that admission. He was started on aspirin 325 mg a   day,  Lipitor, Metformin and discharged from rehab on 10/30, now   presenting with worsened aphasia, concerning for a new stroke versus   seizure. The punctate hemorrhage in the area of old ischemic infarct is   not a concern. He does not need to be in the ICU. Continue him on his   aspirin 325 mg a day. Agree with planned MRI of the brain and I will   order an EEG.          MD CLAUDIO Silverman / Snow.Jenni   D:  11/07/2017   05:25   T:  11/07/2017   09:56   Job #:  [id number]

## 2017-11-08 LAB
GLUCOSE BLD STRIP.AUTO-MCNC: 125 MG/DL (ref 65–100)
GLUCOSE BLD STRIP.AUTO-MCNC: 130 MG/DL (ref 65–100)
GLUCOSE BLD STRIP.AUTO-MCNC: 166 MG/DL (ref 65–100)
GLUCOSE BLD STRIP.AUTO-MCNC: 238 MG/DL (ref 65–100)
SERVICE CMNT-IMP: ABNORMAL

## 2017-11-08 PROCEDURE — 74011250637 HC RX REV CODE- 250/637: Performed by: FAMILY MEDICINE

## 2017-11-08 PROCEDURE — 74011636637 HC RX REV CODE- 636/637: Performed by: FAMILY MEDICINE

## 2017-11-08 PROCEDURE — 65660000000 HC RM CCU STEPDOWN

## 2017-11-08 PROCEDURE — 82962 GLUCOSE BLOOD TEST: CPT

## 2017-11-08 PROCEDURE — 97535 SELF CARE MNGMENT TRAINING: CPT

## 2017-11-08 PROCEDURE — 74011250637 HC RX REV CODE- 250/637: Performed by: PSYCHIATRY & NEUROLOGY

## 2017-11-08 PROCEDURE — 92523 SPEECH SOUND LANG COMPREHEN: CPT | Performed by: SPEECH-LANGUAGE PATHOLOGIST

## 2017-11-08 PROCEDURE — 97116 GAIT TRAINING THERAPY: CPT

## 2017-11-08 RX ORDER — QUETIAPINE FUMARATE 25 MG/1
12.5 TABLET, FILM COATED ORAL 2 TIMES DAILY
Status: DISCONTINUED | OUTPATIENT
Start: 2017-11-08 | End: 2017-11-10 | Stop reason: HOSPADM

## 2017-11-08 RX ORDER — INSULIN LISPRO 100 [IU]/ML
INJECTION, SOLUTION INTRAVENOUS; SUBCUTANEOUS
Status: DISCONTINUED | OUTPATIENT
Start: 2017-11-08 | End: 2017-11-10 | Stop reason: HOSPADM

## 2017-11-08 RX ADMIN — BROMOCRIPTINE MESYLATE 2.5 MG: 2.5 TABLET ORAL at 21:15

## 2017-11-08 RX ADMIN — INSULIN LISPRO 2 UNITS: 100 INJECTION, SOLUTION INTRAVENOUS; SUBCUTANEOUS at 13:14

## 2017-11-08 RX ADMIN — ATORVASTATIN CALCIUM 20 MG: 20 TABLET, FILM COATED ORAL at 21:15

## 2017-11-08 RX ADMIN — FAMOTIDINE 20 MG: 20 TABLET, FILM COATED ORAL at 21:15

## 2017-11-08 RX ADMIN — ASPIRIN 325 MG: 325 TABLET ORAL at 09:25

## 2017-11-08 RX ADMIN — QUETIAPINE FUMARATE 12.5 MG: 25 TABLET ORAL at 16:06

## 2017-11-08 RX ADMIN — FAMOTIDINE 20 MG: 20 TABLET, FILM COATED ORAL at 09:25

## 2017-11-08 RX ADMIN — BROMOCRIPTINE MESYLATE 2.5 MG: 2.5 TABLET ORAL at 00:24

## 2017-11-08 RX ADMIN — Medication 10 ML: at 13:15

## 2017-11-08 RX ADMIN — Medication 10 ML: at 21:18

## 2017-11-08 NOTE — PROGRESS NOTES
MD notified concerning BS check after patient completed meal.  MD Moeller stated that patient's BS should be checked prior to each meal. AC HS. Patient not to be covered. Patient's BS to be checked at [facility] and covered appropriately.

## 2017-11-08 NOTE — PROGRESS NOTES
Patient able to get words out a little bit better, patient able to tell me he was in the Hospital (early today he kept stating he was at home and would get frustrated when told he was at the hospital bc he kept stating that is what he said, \"I know I just said I was at home\" . Able to relax while talking.  (Patient given 12.5mg of seroquel around 4pm)    Tonight after Seroquel patient stated \"I just want to go home, I am ready to leave the hospital\"

## 2017-11-08 NOTE — PROGRESS NOTES
Hospitalist Progress Note          Lien Orosco MD  Please call  and page for questions. Call physician on-call through the  7pm-7am    Daily Progress Note: 11/8/2017    Primary care provider:Rich Swenson MD    Date of admission: 11/6/2017 11:51 AM    Admission summery and hospital course:  73-year-old gentleman with past medical history of diabetes and hyperlipidemia, who presents to the hospital complaining of aphasia. Pt was just admitted to [facility] after presenting with N/V/aphasia, found to have acute left multifocal MCA infarcts, diagnosed with hypercholesterolemia, DM, and left ICA stenosis 50-79% during the admission, started on ASA 325mg daily, Lipitor 20mg daily, and metformin 500mg bid.  He was discharged from [place] on 10/31/17. The patient apparently had his  visit at his home and the  got concerned because the patient was confused and was having expressive as well as receptive aphasia. EMS was called.     Subjective:   Patient denied any acute issue today. Patient keeps on saying that people does not understand him as it slow process to get things better. He thinks he is at his home. Assessment/Plan:   CVA:  His gross motor skill has been improving but patient is confused and aphasic. Aphasia is definitely worse than at discharge from [facility] as per neurologist.    MRI showed moderate to large area of infarction predominantly in the left parietal and left temporal lobes with minimal associated superimposed hemorrhage in the left parietal lobe. There are small punctate foci of infarction in theperiventricular white matter on the left as well. EEG on 11/07 was normal.   I do not think patient has ability to make the decision. Will consult psych. I discussed with Dr. Dia Kaur. DAIN to investigate for possible source of thromboembolism. Continue ASA, statin and serial neurological evaluation.  Continue PT/OT and ST.       Encephalopathy: Due to above. Monitor.      Hypertension:   BP is reasonable now. Continue to monitor with current medicine.      DM: Monitor with SSI for now.      See orders for other plans. VTE prophylaxis: SCD. Code status: Full  Discussed plan of care with Patient/Family and Nurse. Pre-admission lived at home. Discharge planning: I do not think patient has ability to make the decision. I am not aware of the patient AMD at this time. Will monitor and await psych input. Review of Systems:     Review of Systems:  Symptom  Y/N  Comments   Symptom  Y/N  Comments    Fever/Chills   n   Chest Pain  n    Poor Appetite   n   Edema   n    Cough  n   Abdominal Pain  n     Sputum  n   Joint Pain  n    SOB/LAUREANO  n   Pruritis/Rash      Nausea/vomit  n   Tolerating PT/OT      Diarrhea  n   Tolerating Diet      Constipation     Other      Could not obtain due to:         Objective:   Physical Exam:     Visit Vitals    /86    Pulse 72    Temp 98.2 °F (36.8 °C)    Resp 20    Wt 84.7 kg (186 lb 11.7 oz)    SpO2 98%      O2 Device: Room air    Temp (24hrs), Av.2 °F (36.8 °C), Min:97.9 °F (36.6 °C), Max:98.7 °F (37.1 °C)         1901 -  0700  In: 4676 [P.O.:120; I.V.:1575]  Out: 1150 [Urine:1150]    General:  Alert, cooperative, no distress, appears stated age. Lungs:   Clear to auscultation bilaterally. Heart:  Regular rate and rhythm, S1, S2 normal, no murmur. Abdomen:   Soft, non-tender. Obese. Bowel sounds normal.   Extremities: Extremities normal, atraumatic, no cyanosis or edema. Skin: Skin color, texture, turgor normal. No rashes or lesions   Neurologic: CNII-XII intact.  Patient is still confused.           Data Review:       Recent Days:  Recent Labs      17   0452  17   1205   WBC  6.7  6.8   HGB  13.9  13.6   HCT  39.2  39.1   PLT  216  213     Recent Labs      17   1205  17   1202   NA  138   --    K  4.5   --    CL  107   --    CO2  23 --    GLU  136*   --    BUN  10   --    CREA  0.95   --    CA  8.6   --    ALB  3.4*   --    SGOT  21   --    ALT  40   --    INR   --   1.1     No results for input(s): PH, PCO2, PO2, HCO3, FIO2 in the last 72 hours.     24 Hour Results:  Recent Results (from the past 24 hour(s))   GLUCOSE, POC    Collection Time: 11/07/17 11:02 PM   Result Value Ref Range    Glucose (POC) 125 (H) 65 - 100 mg/dL    Performed by [address] [facility], POC    Collection Time: 11/08/17  6:21 AM   Result Value Ref Range    Glucose (POC) 130 (H) 65 - 100 mg/dL    Performed by [address] [facility], POC    Collection Time: 11/08/17 11:55 AM   Result Value Ref Range    Glucose (POC) 166 (H) 65 - 100 mg/dL    Performed by Giancarlo Ramirez        Problem List:  Problem List as of 11/8/2017  Date Reviewed: 11/6/2017          Codes Class Noted - Resolved    * (Principal)CVA (cerebral vascular accident) [facility]) ICD-10-CM: I63.9  ICD-9-CM: 434.91  11/6/2017 - Present              Medications reviewed  Current Facility-Administered Medications   Medication Dose Route Frequency    atorvastatin (LIPITOR) tablet 20 mg  20 mg Oral QHS    bromocriptine (PARLODEL) tablet 2.5 mg  2.5 mg Oral QHS    sodium chloride (NS) flush 5-10 mL  5-10 mL IntraVENous Q8H    sodium chloride (NS) flush 5-10 mL  5-10 mL IntraVENous PRN    acetaminophen (TYLENOL) tablet 650 mg  650 mg Oral Q4H PRN    Or    acetaminophen (TYLENOL) solution 650 mg  650 mg Per NG tube Q4H PRN    Or    acetaminophen (TYLENOL) suppository 650 mg  650 mg Rectal Q4H PRN    famotidine (PEPCID) tablet 20 mg  20 mg Oral Q12H    glucose chewable tablet 16 g  4 Tab Oral PRN    dextrose (D50W) injection syrg 12.5-25 g  12.5-25 g IntraVENous PRN    glucagon (GLUCAGEN) injection 1 mg  1 mg IntraMUSCular PRN    insulin lispro (HUMALOG) injection   SubCUTAneous Q6H    hydrALAZINE (APRESOLINE) 20 mg/mL injection 20 mg  20 mg IntraVENous Q6H PRN    aspirin (ASPIRIN) tablet 325 mg  325 mg Oral DAILY       Care Plan discussed with: Patient/Family, Nurse,  and Consultant Dr Martínez Daley. Total time spent with patient: 45 minutes.     Promise Estevez MD

## 2017-11-08 NOTE — PROGRESS NOTES
Bedside shift change report given to Alyse rFy RN (oncoming nurse) by Jaci RN (offgoing nurse). Report included the following information SBAR, Kardex, Intake/Output, MAR and Recent Results.

## 2017-11-08 NOTE — PROGRESS NOTES
Problem: Self Care Deficits Care Plan (Adult)  Goal: *Acute Goals and Plan of Care (Insert Text)  Occupational Therapy Goals  Initiated 11/7/2017  1. Patient will perform upper body ADLs standing 5 mins without fatigue or LOB with independence within 7 day(s). MET after trial and error to get supplies for patient secondary to aphasia and inability to express needs correctly 11/8/17  2. Patient will participate in upper extremity therapeutic exercise/activities with independence for 10 minutes within 7 day(s). 3.  Patient will utilize energy conservation and good SAFETY awareness techniques during functional activities without cues within 7 day(s). Occupational Therapy TREATMENT  Patient: Darian Mane (80 y.o. male)  Date: 11/8/2017  Diagnosis: ICH (intracerebral hemorrhage) ([facility]) CVA (cerebral vascular accident) [facility])       Precautions:    Chart, occupational therapy assessment, plan of care, and goals were reviewed. ASSESSMENT:  Based on the objective data below, the patient participated in self care, standing endurance at sink for self care (approximately 15 minutes completed), and in room mobility with supervision to mod I within familiar environment of his hospital room. Performance impacted by impaired ability to express needs and decreased ability to understand verbal information secondary to aphasia. He demonstrates impulsivity and becomes increasingly frustrated and agitated when his needs are not understood. Patient is unsafe to live alone as he is unable to express is needs, call for help/assist effectively, or relate to someone who does not know his deficits which could lead to an unsafe situation. He states he had begun to drive. Instructed He is not to drive. He did say \"I know\". Patient lives alone at baseline.    Progression toward goals:  []          Improving appropriately and progressing toward goals  [x]          Improving slowly and progressing toward goals  []          Not making progress toward goals and plan of care will be adjusted     PLAN:  Patient continues to benefit from skilled intervention to address the above impairments. Continue treatment per established plan of care. Discharge Recommendations: To Be Determined  Further Equipment Recommendations for Discharge:  none     SUBJECTIVE:   Patient stated she didn't get stuff.  referring to nurse getting him razor after patient demonstrating need to shave    OBJECTIVE DATA SUMMARY:   Cognitive/Behavioral Status:  Neurologic State: Alert  Orientation Level: Oriented to person; Unable to verbalize (Difficulty expressing responses secondary to aphasia)  Cognition: Decreased attention/concentration; Impulsive;Decreased command following  Perception: Appears intact  Perseveration: Perseverates during conversation  Safety/Judgement: Awareness of environment  Functional Mobility and Transfers for ADLs:   Bed Mobility:                 Transfers:  Sit to Stand: Modified independent       Balance:  Sitting: Intact  Standing: Intact  ADL Intervention:       Grooming  Grooming Assistance: Modified independent  Washing Face: Modified independent  Washing Hands: Modified independent  Brushing Teeth: Modified independent  Shaving: Modified independent    Upper Body Bathing  Bathing Assistance: Supervision/set-up (in simulation for mobility but not observed for efficiency/use of soap appropriately)  Position Performed: Seated in chair;Standing    Lower Body Bathing  Lower Body :  (demonstrates ability to use cross leg technique to reach feet while seated)  Position Performed: Seated in chair    Upper Body [address]: Minimum  assistance (to untie and retie ties on gown when changing)    Lower Body Dressing Assistance  Socks: Modified independent  Leg Crossed Method Used: Yes         Cognitive Retraining  Problem Solving: Identifying the problem;General alternative solution  Organizing/Sequencing: Breaking task down  Attention to Task: Distractibility  Following Commands:  (demonstrates expressive and receptive aphasia)  Safety/Judgement: Awareness of environment  Cues: Tactile cues provided;Verbal cues provided;Visual cues provided       Activity Tolerance:    Fair  Please refer to the flowsheet for vital signs taken during this treatment.   After treatment:   [x]  Patient left in no apparent distress sitting up in chair  []  Patient left in no apparent distress in bed  [x]  Call bell left within reach  [x]  Nursing notified  []  Caregiver present  []  Bed alarm activated    COMMUNICATION/COLLABORATION:   The patients plan of care was discussed with: Speech Therapist and Registered Nurse    CHER Edwards  Time Calculation: 26 mins

## 2017-11-08 NOTE — PROGRESS NOTES
Problem: Communication Impaired (Adult)  Goal: *Acute Goals and Plan of Care (Insert Text)  Speech Goals  Initiated 11/8/2017  1. Patient will answer simple y/n questions with 25% accuracy with max cues within 7 days  2. Patient will follow one step commands with 25% accuracy with max cues within 7 days  3. Patient will complete simple confrontation and responsive naming with 40% accuracy with max cues within 7 days      Speech LAnguage Pathology evaluation  Patient: Nicole Kenyon (35 y.o. male)  Date: 11/8/2017  Primary Diagnosis: ICH (intracerebral hemorrhage) ([facility]        Precautions:        ASSESSMENT :  Based on the objective data described below, the patient presents with severe receptive and mod-severe receptive aphasia with a Wernicke's presentation. Patient with fluent speech, frequent perseverations, confabulations and circumlocutions. Some appropriate responses, however, most were automatic phrases such as \"how are you\" and \"I understand\" and responses became more inappropriate as assessment progressed. Patient with decreased recognition of deficits, continually telling SLP that he was fine or getting better. Patient is currently unable to express basic wants and needs and is unable to reliably answer y/n questions. Patient will require intensive language intervention to address these deficits and will require 24 hour supervision upon discharge to ensure safety as he would be unable to request help in an emergency with his current language impairments. Patient will benefit from skilled intervention to address the above impairments.   Patients rehabilitation potential is considered to be Fair  Factors which may influence rehabilitation potential include:   []              None noted  []              Mental ability/status  [x]              Medical condition  []              Home/family situation and support systems  []              Safety awareness  []              Pain tolerance/management  [] Other: PLAN :  Recommendations and Planned Interventions:  -- SLP to follow for language intervention  Frequency/Duration: Patient will be followed by speech-language pathology 3 times a week to address goals. Discharge Recommendations: Skilled Nursing Facility     SUBJECTIVE:   Patient stated I know what I want to say and I've been getting better during assessment. Patient with frequent perseverations and did not respond to redirection. Patient with increased frustration when he was unable to complete assessment tasks. Frequent jargon and confabulations such as when asked Althia Hockey you a man? \" he responded with \"is the man hot, is that what you're asking, the hot man, is he hot, is that what you want. [facility] \"     OBJECTIVE:     Past Medical History:   Diagnosis Date    Diabetes ([facility] [address]     Hyperlipemia    History reviewed. No pertinent surgical history. Prior Level of Function/Home Situation:      Mental Status:  Neurologic State: Alert  Orientation Level: Oriented to person  Cognition: No command following  Perception: Appears intact  Perseveration: No perseveration noted  Safety/Judgement: Decreased awareness of need for safety, Decreased insight into deficits  Motor Speech:     Language Comprehension and Expression:  Auditory Comprehension  Auditory Impairment: Yes  Response to Basic Yes/No Questions (%): 0 %  One-Step Basic Commands (%): 0 %  Interfering Components: Impulsivity  Effective Techniques: Visual/Gestural cues  Verbal Expression  Primary Mode of Expression: Verbal  Initiation: No impairment  Automatic Speech Task: Impaired (comment) (cues to count 1-5)  Naming: Impaired  Confrontation (%): 20 %  Responsive (%): 0 %      G Codes: In compliance with CMSs Claims Based Outcome Reporting, the following G-code set was chosen for this patient based the use of the NOMS functional outcome to quantify this patient's level of spoken language comprehension impairment.     Using the NOMS, the patient was determined to be at level 2 for spoken language comprehension which correlates with the CM= 80-99% level of severity. Based on the objective assessment provided within this note, the current, goal, and discharge g-codes are as follows:    Comprehension   Lang Comp Current Status CM= 80-99%   Lang Comp Goal Status CL= 60-79%    NOMS:    NOMS Spoken Language Comprehension:  Level 1 (CN): Unable to follow directions or respond to simple yes/no  Level 2 (CM): Can follow directions & respond to simple y/n in context with max cues. Level 3 (CL): Responds to simple y/n; follows simple commands w/mod cues out of context  Level 4 (CK): Responds to y/n; occasionally follows simple directions without cues. Mod cues for complex info. Understands limited conversation about routine activities with familiar communication partner  Level 5 (Georgie Rinne): Understands communication in structured conversation with familiar and unfamiliar partners. Min cues for complex information. Occasionally initiates use of compensatory strategies  Level 6 (CI): Limitations still apparent in voc/social situations. Rarely cued for complex info. Level 7 (CH): Independently participates in vocational, avocational, and social situations. ILAN. (2003). National Outcomes Measurement System (NOMS): Adult Speech-Language Pathology User's Guide. Pain:  Pain Scale 1: Numeric (0 - 10)  Pain Intensity 1: 0     After treatment:   []              Patient left in no apparent distress sitting up in chair  [x]              Patient left in no apparent distress in bed  [x]              Call bell left within reach  [x]              Nursing notified  []              Caregiver present  []              Bed alarm activated    COMMUNICATION/EDUCATION:   The patients plan of care including recommendations and planned interventions was discussed with: Registered Nurse.   Patient was educated regarding His deficit(s) of aphasia as this relates to His diagnosis of ICH. He demonstrated Guarded understanding as evidenced by no response to education. [x]  Patient/family have participated as able in goal setting and plan of care. [x]  Patient/family agree to work toward stated goals and plan of care. []  Patient understands intent and goals of therapy, but is neutral about his/her participation. []  Patient is unable to participate in goal setting and plan of care. Thank you for this referral.  Kirstin Hernandez Student SLP   Time Calculation: 16 mins     Regarding student involvement in patient care:  A student participated in this treatment session. Per CMS Medicare statements and APTA guidelines I certify that the following was true:  1. I was present and directly observed the entire session. 2. I made all skilled judgments and clinical decisions regarding care. 3. I am the practitioner responsible for assessment, treatment, and documentation.

## 2017-11-08 NOTE — PROGRESS NOTES
Problem: Mobility Impaired (Adult and Pediatric)  Goal: *Acute Goals and Plan of Care (Insert Text)  Physical Therapy Goals  Initiated 11/7/2017  1. Patient will move from supine to sit and sit to supine  and scoot up and down in bed with independence within 7 day(s). 2.  Patient will transfer from bed to chair and chair to bed with independence using the least restrictive device within 7 day(s). 3.  Patient will perform sit to stand with independence within 7 day(s). 4.  Patient will ambulate with independence for 400 feet with the least restrictive device within 7 day(s). 5.  Patient will improve Guerra Balance score by 7 points within 7 days. physical Therapy TREATMENT  Patient: Genoveva Navarrete (12 y.o. male)  Date: 11/8/2017  Diagnosis: ICH (intracerebral hemorrhage) ([facility]) CVA (cerebral vascular accident) ([facility] [address] [address]       Precautions:      ASSESSMENT:  Cleared for mobility progression by RN. Received pt up ad alisa in room completing morning ADL routine. Continues to become easily frustrated with aphasia but agreeable to participation in therapy. Gait training progressed to approx 400ft in hallway with CGA/SBA for safety, occasional cues to slow pace. Emphasis on incorporating DGI components - mild increased postural sway with head movements but no major LOB. Reassessed guerra balance test, see below. Pt appears to be near his functional mobility baseline however am concerned about overall safety awareness and judgement - likely would not be safe if he in fact lives at home alone. Will continue to follow during acute admission. Progression toward goals:  [x]    Improving appropriately and progressing toward goals  []    Improving slowly and progressing toward goals  []    Not making progress toward goals and plan of care will be adjusted     PLAN:   Patient continues to benefit from skilled intervention to address the above impairments. Continue treatment per established plan of care.   Discharge Recommendations:  TBD  Further Equipment Recommendations for Discharge:  None anticipated     SUBJECTIVE:   Patient stated I just want to get some more clear so I can school.     OBJECTIVE DATA SUMMARY:   Critical Behavior:  Neurologic State: Alert  Orientation Level: Oriented to person, Unable to verbalize (Difficulty expressing responses secondary to aphasia)  Cognition: Decreased attention/concentration, Impulsive, Decreased command following  Safety/Judgement: Awareness of environment  Functional Mobility Training:     Transfers:  Sit to Stand: Supervision  Stand to Sit: Supervision     Balance:  Sitting: Intact  Standing: Intact  Standing - Static: Good  Standing - Dynamic : Good  Ambulation/Gait Training:  Distance (ft): 400 Feet (ft)  Assistive Device: Gait belt  Ambulation - Level of Assistance: Contact guard assistance;Stand-by asssistance  Gait Abnormalities: Trunk sway increased  Base of Support: Narrowed  Speed/Leonila: Fluctuations  Step Length: Right shortened;Left shortened        Pain:  Pain Scale 1: Numeric (0 - 10)  Pain Intensity 1: 0       Ruff Balance Test:    Sitting to Standing: 3  Standing Unsupported: 4  Sitting with Back Unsupported: 4  Standing to Sittin  Transfers: 3  Standing Unsupported with Eyes Closed: 3  Standing Unsupported with Feet Together: 3  Reach Forward with Outstretched Arm: 2   Object: 3  Turn to Look Over Shoulders: 3  Turn 360 Degrees: 3  Alternate Foot on Step/Stool: 2  Standing Unsupported One Foot in Front: 0  Stand on One Le  Total: 37         56=Maximum possible score;   0-20=High fall risk  21-40=Moderate fall risk   41-56=Low fall risk     Ruff Balance Test and G-code impairment scale:  Percentage of Impairment CH    0%   CI    1-19% CJ    20-39% CK    40-59% CL    60-79% CM    80-99% CN     100%   Ruff   Score 0-56 56 45-55 34-44 23-33 12-22 1-11 0           Activity Tolerance:   VSS, NAD  Please refer to the flowsheet for vital signs taken during this treatment.   After treatment:   []    Patient left in no apparent distress sitting up in chair  []    Patient left in no apparent distress in bed  []    Call bell left within reach  []    Nursing notified  []    Caregiver present  []    Bed alarm activated    COMMUNICATION/COLLABORATION:   The patients plan of care was discussed with: Certified Occupational Therapy Assistant and Registered Nurse    Modesta Stone PT, DPT   Time Calculation: 11 mins

## 2017-11-08 NOTE — PROGRESS NOTES
Neurology Progress Note     NAME: Kaur Edwards   :  1945   MRN:  [id number]   DATE:  2017    Assessment:     Principal Problem:    CVA (cerebral vascular accident) ([address] (2017)      Pt is a 78yo male recently admitted to [facility] on 10/23 with aphasia, nausea and vomiting, found to have multiple left MCA infarcts diagnosed with hypercholesterolemia, and diabetes and left ICA stenosis during that admission. He was started on aspirin 325 mg a day,  Lipitor, Metformin and discharged from rehab on 10/30, presenting to [facility] 17 with worsened aphasia. MRI brain with new acute infarct on left, predominantly in temporal lobe. CTA of neck with only 50% left ICA stenosis. LDL 42.8. HgbA1C 7.6. EEG 17 is normal.   Reviewed imaging with Neuroradiology. No clear explanation for recurrent multifocal CVAs, though in a single vascular territory, appears embolic and left ICA on CTA is relatively clean. Pt has significant receptive and expressive aphasia. I do not feel that he is able to make his own medical decisions. I spoke with his cousin in [place] yesterday, but she is not his POA. Plan:   -DAIN to evaluate for cardioembolic source.   -Continue ASA   -Consider adding Plavix pending DAIN results  -Continue Atorvastatin  -PT/OT/ST    Chart reviewed since last seen  Subjective:   Pt still agitated and wanting to leave. Clearly does not understand what is being said to him.      Objective:     Current Facility-Administered Medications   Medication Dose Route Frequency    atorvastatin (LIPITOR) tablet 20 mg  20 mg Oral QHS    bromocriptine (PARLODEL) tablet 2.5 mg  2.5 mg Oral QHS    sodium chloride (NS) flush 5-10 mL  5-10 mL IntraVENous Q8H    sodium chloride (NS) flush 5-10 mL  5-10 mL IntraVENous PRN    acetaminophen (TYLENOL) tablet 650 mg  650 mg Oral Q4H PRN    Or    acetaminophen (TYLENOL) solution 650 mg  650 mg Per NG tube Q4H PRN    Or    acetaminophen (TYLENOL) suppository 650 mg  650 mg Rectal Q4H PRN    famotidine (PEPCID) tablet 20 mg  20 mg Oral Q12H    glucose chewable tablet 16 g  4 Tab Oral PRN    dextrose (D50W) injection syrg 12.5-25 g  12.5-25 g IntraVENous PRN    glucagon (GLUCAGEN) injection 1 mg  1 mg IntraMUSCular PRN    insulin lispro (HUMALOG) injection   SubCUTAneous Q6H    hydrALAZINE (APRESOLINE) 20 mg/mL injection 20 mg  20 mg IntraVENous Q6H PRN    aspirin (ASPIRIN) tablet 325 mg  325 mg Oral DAILY       Visit Vitals    /86    Pulse 72    Temp 98.2 °F (36.8 °C)    Resp 20    Wt 84.7 kg (186 lb 11.7 oz)    SpO2 98%     Temp (24hrs), Av.3 °F (36.8 °C), Min:97.9 °F (36.6 °C), Max:98.8 °F (37.1 °C)         1901 -  0700  In: 6221 [P.O.:120; I.V.:1575]  Out: 1150 [Urine:1150]      Physical Exam:  General: Well developed well nourished patient in no apparent distress. Cardiac: Regular rate and rhythm with no murmurs. Extremities: 2+ Radial pulses, no cyanosis or edema    Neurological Exam:  Mental Status: Agitated oriented to person, difficulty following commands, spontaneous speech is improved, perseverates   Cranial Nerves:   EOMI, no nystagmus, no ptosis. Facial movement is asymmetric on right.      Motor:  5/5 throughout   Reflexes:      Sensory:      Gait: Steady gait    Cerebellar:           Lab Review   Recent Results (from the past 24 hour(s))   GLUCOSE, POC    Collection Time: 17  1:07 PM   Result Value Ref Range    Glucose (POC) 140 (H) 65 - 100 mg/dL    Performed by Bree Diaz, POC    Collection Time: 17 11:02 PM   Result Value Ref Range    Glucose (POC) 125 (H) 65 - 100 mg/dL    Performed by [id number], POC    Collection Time: 17  6:21 AM   Result Value Ref Range    Glucose (POC) 130 (H) 65 - 100 mg/dL    Performed by Chris Ramirez Additional comments:  I have reviewed the patient's new clinical lab test results. I have personally reviewed the patient's radiographs. MRI   MRI Results (most recent):    Results from [facility] encounter on 11/06/17   MRI BRAIN WO CONT   Narrative Preliminary report: Acute left parietal and temporal infarcts with several foci  of associated acute left parietal intraparenchymal hemorrhage, unchanged  compared to prior CT dated November 6, 2017. Several additional acute left  frontal, parietal and left basal ganglia acute subcentimeter infarcts, not  visualized on prior CT. Final report to follow. EXAM:  MRI BRAIN WO CONT  Clinical history: Speech changes  INDICATION:    Speech changes    COMPARISON:  11/6/2017 CTA. CONTRAST: None    TECHNIQUE:    MR imaging of the brain was performed with sagittal T1, axial T1, T2, FLAIR,  GRE, DWI/ADC; multiplanar T1 images . FINDINGS:   Moderate to large area of acute infarction predominantly in the left parietal  and temporal and lobes. Additional scattered foci of infarction in the  periventricular white matter on the left. No right-sided infarctions are  demonstrated. Punctate foci of hemorrhage are associated with the infarction in  the left parietal lobe. There is no Chiari or syrinx. There are degenerative changes in the upper  cervical spine with canal stenosis at C3-C4. Pituitary infundibulum  unremarkable. . No midline shift or mass effect. Minimal mucoperiosteal  thickening in the maxillary sinuses greater on the left. . Cavernous sinuses are  symmetric. Peggyann Gang Normal appearing flow-voids are present in the vertebral, basilar  and carotid artery systems. The craniocervical junction is normal.  The  structures at the cranial base including paranasal sinuses and mastoid air cells  are unremarkable.           Impression IMPRESSION:      Moderate to large area of infarction predominantly in the left parietal and left  temporal lobes with minimal associated superimposed hemorrhage in the left  parietal lobe. There are small punctate foci of infarction in the  periventricular white matter on the left as well. There is no midline shift or mass effect. Care Plan discussed with:  Patient x   Family    RN x   Care Manager    Hospitalist:  delphine     Signed: Sarwat Simmons MD

## 2017-11-08 NOTE — PROGRESS NOTES
CM spoke with  Speech Therapist who said that patient's aphasia condition results in significant impairment of ability to give and receive intelligible communication. CM spoke with the hospitalist who said that there is no MPOA  that has been identified and that he spoke with a relative in [place]. Due to concerns about patient's mental capacity, a psychiatric consult was requested. TAB spoke with the neurologist who said that patient reported a positive recent experience at [facility] and that he would like to return there. CM sent referral via [employer] to [facility], and response was received that they will start the assessment process.  CM gave verbal update to the neurologist and the hospitalist.

## 2017-11-08 NOTE — PROGRESS NOTES
Primary Nurse Marley Stanley RN and Basilio Gerardo., LAYLA performed a dual skin assessment on this patient No impairment noted  Jeronimo score is 19

## 2017-11-08 NOTE — CONSULTS
Asked to see re confusion and agitation. Pt with chronic vascular changes on MRI of brain and now with left temporal CVA. Pt has been agitated, wanting to and trying to leave, removing ekg leads, and angry. MSE shows confusion, expressive aphasia, unclear how well he understands what is said to him. Likely he can receive information but for brief time and then moves into perseverative confused communication. He could write his name when asked but could not finish writing his address. Memory and recall deficits certainly can go along with cerebral vascular chronic ad acute pathology. Pt also with poor frustration tolerance, no insight into medical condition. There are brief times of awareness which quickly deteriorate.   Dx: CVA, left temporal         Chronic cerebrovascular disease  Quetiapine 12.5 mg for agitation

## 2017-11-09 LAB
GLUCOSE BLD STRIP.AUTO-MCNC: 124 MG/DL (ref 65–100)
GLUCOSE BLD STRIP.AUTO-MCNC: 140 MG/DL (ref 65–100)
GLUCOSE BLD STRIP.AUTO-MCNC: 165 MG/DL (ref 65–100)
GLUCOSE BLD STRIP.AUTO-MCNC: 167 MG/DL (ref 65–100)
GLUCOSE BLD STRIP.AUTO-MCNC: 169 MG/DL (ref 65–100)
SERVICE CMNT-IMP: ABNORMAL

## 2017-11-09 PROCEDURE — 65660000000 HC RM CCU STEPDOWN

## 2017-11-09 PROCEDURE — 82962 GLUCOSE BLOOD TEST: CPT

## 2017-11-09 PROCEDURE — 74011250637 HC RX REV CODE- 250/637: Performed by: PSYCHIATRY & NEUROLOGY

## 2017-11-09 PROCEDURE — 74011250637 HC RX REV CODE- 250/637: Performed by: FAMILY MEDICINE

## 2017-11-09 PROCEDURE — 97116 GAIT TRAINING THERAPY: CPT

## 2017-11-09 PROCEDURE — 74011636637 HC RX REV CODE- 636/637: Performed by: FAMILY MEDICINE

## 2017-11-09 RX ADMIN — INSULIN LISPRO 2 UNITS: 100 INJECTION, SOLUTION INTRAVENOUS; SUBCUTANEOUS at 12:18

## 2017-11-09 RX ADMIN — INSULIN LISPRO 2 UNITS: 100 INJECTION, SOLUTION INTRAVENOUS; SUBCUTANEOUS at 08:43

## 2017-11-09 RX ADMIN — Medication 10 ML: at 21:48

## 2017-11-09 RX ADMIN — FAMOTIDINE 20 MG: 20 TABLET, FILM COATED ORAL at 21:46

## 2017-11-09 RX ADMIN — QUETIAPINE FUMARATE 12.5 MG: 25 TABLET ORAL at 08:43

## 2017-11-09 RX ADMIN — Medication 10 ML: at 13:32

## 2017-11-09 RX ADMIN — BROMOCRIPTINE MESYLATE 2.5 MG: 2.5 TABLET ORAL at 21:45

## 2017-11-09 RX ADMIN — ASPIRIN 325 MG: 325 TABLET ORAL at 08:43

## 2017-11-09 RX ADMIN — ATORVASTATIN CALCIUM 20 MG: 20 TABLET, FILM COATED ORAL at 21:46

## 2017-11-09 RX ADMIN — FAMOTIDINE 20 MG: 20 TABLET, FILM COATED ORAL at 08:43

## 2017-11-09 RX ADMIN — QUETIAPINE FUMARATE 12.5 MG: 25 TABLET ORAL at 17:02

## 2017-11-09 NOTE — PROGRESS NOTES
Hospitalist Progress Note          Soco Hough MD  Please call  and page for questions. Call physician on-call through the  7pm-7am    Daily Progress Note: 11/9/2017    Primary care provider:Rich iFsher MD    Date of admission: 11/6/2017 11:51 AM    Admission summery and hospital course:  69-year-old gentleman with past medical history of diabetes and hyperlipidemia, who presents to the hospital complaining of aphasia. Pt was just admitted to [facility] after presenting with N/V/aphasia, found to have acute left multifocal MCA infarcts, diagnosed with hypercholesterolemia, DM, and left ICA stenosis 50-79% during the admission, started on ASA 325mg daily, Lipitor 20mg daily, and metformin 500mg bid.  He was discharged from [address] [place] [address] on 10/31/17. The patient apparently had his  visit at his home and the  got concerned because the patient was confused and was having expressive as well as receptive aphasia. EMS was called.   Subjective:   Patient said he would like to leave ASAP. Patient has expressive aphasia. Assessment/Plan:   CVA:  His gross motor skill has been improving but patient is confused and aphasic. Aphasia is definitely worse than at discharge from [facility] as per neurologist.    MRI showed moderate to large area of infarction predominantly in the left parietal and left temporal lobes with minimal associated superimposed hemorrhage in the left parietal lobe. There are small punctate foci of infarction in theperiventricular white matter on the left as well. EEG on 11/07 was normal.   I do not think patient has ability to make the decision. Will consult psych. I discussed with Dr. Shakila Fair. DAIN to investigate for possible source of thromboembolism. Continue ASA, statin and serial neurological evaluation. Continue PT/OT and ST.       Encephalopathy: Due to above. Monitor.       Hypertension:   BP is reasonable now. Continue to monitor with current medicine.       DM:   Monitor with SSI for now.       See orders for other plans. VTE prophylaxis: SCD. Code status: Full  Discussed plan of care with Patient/Family and Nurse. Pre-admission lived at home. Discharge planning: I do not think patient has ability to make the decision. I am not aware of the patient AMD at this time. Appreciate psych input. Will wait for the DAIN report. May need rehab. Review of Systems:   Patient has expressive aphasia. Review of Systems:  Symptom  Y/N  Comments   Symptom  Y/N  Comments    Fever/Chills      Chest Pain      Poor Appetite      Edema       Cough     Abdominal Pain       Sputum     Joint Pain      SOB/LAUREANO     Pruritis/Rash      Nausea/vomit     Tolerating PT/OT      Diarrhea     Tolerating Diet      Constipation     Other      Could not obtain due to:         Objective:   Physical Exam:     Visit Vitals    BP (!) 122/93 (BP 1 Location: Right arm, BP Patient Position: At rest)    Pulse 81    Temp 97.8 °F (36.6 °C)    Resp 14    Wt 84.1 kg (185 lb 6.5 oz)    SpO2 97%      O2 Device: Room air    Temp (24hrs), Av °F (36.7 °C), Min:97.8 °F (36.6 °C), Max:98.2 °F (36.8 °C)    701 -  1900  In: 240 [P.O.:240]  Out: -       General:  Alert, cooperative, no distress, appears stated age. Lungs:   Clear to auscultation bilaterally. Heart:  Regular rate and rhythm, S1, S2 normal, no murmur. Abdomen:   Soft, non-tender. Obese. Bowel sounds normal.   Extremities: Extremities normal, atraumatic, no cyanosis or edema. Skin: Skin color, texture, turgor normal. No rashes or lesions   Neurologic: CNII-XII intact. Patient is still confused.          Data Review:       Recent Days:  Recent Labs      17   0452   WBC  6.7   HGB  13.9   HCT  39.2   PLT  216     No results for input(s): NA, K, CL, CO2, GLU, BUN, CREA, CA, MG, PHOS, ALB, TBIL, SGOT, ALT, INR in the last 72 hours.     No lab exists for component: INREXT  No results for input(s): PH, PCO2, PO2, HCO3, FIO2 in the last 72 hours.     24 Hour Results:  Recent Results (from the past 24 hour(s))   GLUCOSE, POC    Collection Time: 11/08/17  6:24 PM   Result Value Ref Range    Glucose (POC) 238 (H) 65 - 100 mg/dL    Performed by Galo Tijerina    GLUCOSE, POC    Collection Time: 11/08/17  9:15 PM   Result Value Ref Range    Glucose (POC) 125 (H) 65 - 100 mg/dL    Performed by [address], POC    Collection Time: 11/09/17  7:11 AM   Result Value Ref Range    Glucose (POC) 140 (H) 65 - 100 mg/dL    Performed by Allegra Ballard    GLUCOSE, POC    Collection Time: 11/09/17 11:13 AM   Result Value Ref Range    Glucose (POC) 169 (H) 65 - 100 mg/dL    Performed by Akil HUTCHINSON(CON)    GLUCOSE, POC    Collection Time: 11/09/17 11:39 AM   Result Value Ref Range    Glucose (POC) 167 (H) 65 - 100 mg/dL    Performed by Adrian Ospina        Problem List:  Problem List as of 11/9/2017  Date Reviewed: 11/6/2017          Codes Class Noted - Resolved    * (Principal)CVA (cerebral vascular accident) [facility]) ICD-10-CM: I63.9  ICD-9-CM: 434.91  11/6/2017 - Present              Medications reviewed  Current Facility-Administered Medications   Medication Dose Route Frequency    QUEtiapine (SEROquel) tablet 12.5 mg  12.5 mg Oral BID    insulin lispro (HUMALOG) injection   SubCUTAneous AC&HS    atorvastatin (LIPITOR) tablet 20 mg  20 mg Oral QHS    bromocriptine (PARLODEL) tablet 2.5 mg  2.5 mg Oral QHS    sodium chloride (NS) flush 5-10 mL  5-10 mL IntraVENous Q8H    sodium chloride (NS) flush 5-10 mL  5-10 mL IntraVENous PRN    acetaminophen (TYLENOL) tablet 650 mg  650 mg Oral Q4H PRN    Or    acetaminophen (TYLENOL) solution 650 mg  650 mg Per NG tube Q4H PRN    Or    acetaminophen (TYLENOL) suppository 650 mg  650 mg Rectal Q4H PRN    famotidine (PEPCID) tablet 20 mg  20 mg Oral Q12H    glucose chewable tablet 16 g  4 Tab Oral PRN    dextrose (D50W) injection syrg 12.5-25 g  12.5-25 g IntraVENous PRN    glucagon (GLUCAGEN) injection 1 mg  1 mg IntraMUSCular PRN    hydrALAZINE (APRESOLINE) 20 mg/mL injection 20 mg  20 mg IntraVENous Q6H PRN    aspirin (ASPIRIN) tablet 325 mg  325 mg Oral DAILY       Care Plan discussed with: Patient/Family, Nurse and     Total time spent with patient: 30 minutes.     Hima Gr MD

## 2017-11-09 NOTE — PROGRESS NOTES
Bedside shift change report given to Jeimy RN (oncoming nurse) by Odell Sharp RN (offgoing nurse). Report included the following information SBAR, Kardex, MAR, Recent Results and Cardiac Rhythm SR with occasional PACs.

## 2017-11-09 NOTE — PROGRESS NOTES
Update:  Rounded on patient with attending. Planning on patient to go to [address] [place] [address] rehab. Patient not sure he wants to go. Called \"caregiver\", Jr Soriano, who lives in [place]. She says she is called his \"caregiver\" because she is the go-between, between the patient and his doctors. What she means is she used to be a \"nurse,\" so she explains things to him that are medical.  She does not live patient, nor does she even visit him, so she is really not a caregiver. She stated he has a dog, and his friend, Charity Machado, is keeping the dog. Laurita Prado is not any kin to patient and states he does not have anyone else. She stated Charity Machado is a friend who doesn't live anywhere near the patient. Spoke with Ilya Palma, [phone], who said they have started insurance auth. Told Twila of psych consult. Patient not ready today.

## 2017-11-09 NOTE — PROGRESS NOTES
Bedside shift change report given to Jaci (oncoming nurse) by Rey Trujillo (offgoing nurse). Report included the following information SBAR, Kardex, Intake/Output, MAR, Accordion, Recent Results and Med Rec Status.

## 2017-11-09 NOTE — PROGRESS NOTES
IDR/SLIDR Summary          Patient: Naveen Kramer MRN: [id number]    Age: 71 y.o. YOB: 1945 Room/Bed: [facility]   Admit Diagnosis: ICH (intracerebral hemorrhage) ([facility])  Principal Diagnosis: CVA (cerebral vascular accident) ([facility]   Goals: DC planning. Psych eval- guardianship may be necessary  Readmission: YES  Quality Measure: Not applicable  VTE Prophylaxis: Mechanical  Influenza Vaccine screening completed? NO  Pneumococcal Vaccine screening completed? NO  Mobility needs: No   Nutrition plan:Yes  Consults:P.T, O.T., Speech and Case Management    Financial concerns:No  Escalated to CM? YES  RRAT Score:    [address]   Testing due for pt today? NO  LOS: 3 days Expected length of stay 4 days  Discharge plan: Possibly [facility].  Pt requesting to be discharged home but plan is unsafe    PCP: Salvatore Domingo MD  Transportation needs: Yes    Days before discharge 1-2 days before discharge  Discharge disposition: [facility] possibly    Signed:     Paulo Lindsay RN  11/9/2017  10:16 AM

## 2017-11-09 NOTE — PROGRESS NOTES
Pt's friend Nicolaslinh Posada called for status update. Her contact information is listed on demographics list, so same was  Provided.

## 2017-11-10 VITALS
TEMPERATURE: 98 F | HEART RATE: 74 BPM | OXYGEN SATURATION: 99 % | SYSTOLIC BLOOD PRESSURE: 138 MMHG | WEIGHT: 186.95 LBS | DIASTOLIC BLOOD PRESSURE: 83 MMHG | RESPIRATION RATE: 17 BRPM

## 2017-11-10 PROBLEM — E11.9 TYPE II DIABETES MELLITUS (HCC): Status: ACTIVE | Noted: 2017-11-10

## 2017-11-10 LAB
GLUCOSE BLD STRIP.AUTO-MCNC: 141 MG/DL (ref 65–100)
GLUCOSE BLD STRIP.AUTO-MCNC: 144 MG/DL (ref 65–100)
SERVICE CMNT-IMP: ABNORMAL
SERVICE CMNT-IMP: ABNORMAL

## 2017-11-10 PROCEDURE — 92507 TX SP LANG VOICE COMM INDIV: CPT

## 2017-11-10 PROCEDURE — 82962 GLUCOSE BLOOD TEST: CPT

## 2017-11-10 PROCEDURE — 74011250637 HC RX REV CODE- 250/637: Performed by: PSYCHIATRY & NEUROLOGY

## 2017-11-10 PROCEDURE — 93325 DOPPLER ECHO COLOR FLOW MAPG: CPT

## 2017-11-10 PROCEDURE — 74011250636 HC RX REV CODE- 250/636: Performed by: INTERNAL MEDICINE

## 2017-11-10 PROCEDURE — B246ZZ4 ULTRASONOGRAPHY OF RIGHT AND LEFT HEART, TRANSESOPHAGEAL: ICD-10-PCS | Performed by: INTERNAL MEDICINE

## 2017-11-10 PROCEDURE — 99152 MOD SED SAME PHYS/QHP 5/>YRS: CPT

## 2017-11-10 RX ORDER — ASPIRIN 325 MG
325 TABLET ORAL DAILY
Qty: 30 TAB | Refills: 0 | Status: SHIPPED | OUTPATIENT
Start: 2017-11-11

## 2017-11-10 RX ORDER — FENTANYL CITRATE 50 UG/ML
25-200 INJECTION, SOLUTION INTRAMUSCULAR; INTRAVENOUS
Status: DISCONTINUED | OUTPATIENT
Start: 2017-11-10 | End: 2017-11-10 | Stop reason: HOSPADM

## 2017-11-10 RX ORDER — CLOPIDOGREL BISULFATE 75 MG/1
75 TABLET ORAL DAILY
Status: DISCONTINUED | OUTPATIENT
Start: 2017-11-10 | End: 2017-11-10 | Stop reason: HOSPADM

## 2017-11-10 RX ORDER — CLOPIDOGREL BISULFATE 75 MG/1
75 TABLET ORAL DAILY
Qty: 30 TAB | Refills: 0 | Status: SHIPPED | OUTPATIENT
Start: 2017-11-11 | End: 2017-11-10

## 2017-11-10 RX ORDER — SODIUM CHLORIDE 0.9 % (FLUSH) 0.9 %
10 SYRINGE (ML) INJECTION AS NEEDED
Status: DISCONTINUED | OUTPATIENT
Start: 2017-11-10 | End: 2017-11-10 | Stop reason: HOSPADM

## 2017-11-10 RX ORDER — MIDAZOLAM HYDROCHLORIDE 1 MG/ML
.5-1 INJECTION, SOLUTION INTRAMUSCULAR; INTRAVENOUS
Status: DISCONTINUED | OUTPATIENT
Start: 2017-11-10 | End: 2017-11-10 | Stop reason: HOSPADM

## 2017-11-10 RX ORDER — ASPIRIN 325 MG
325 TABLET ORAL DAILY
Qty: 30 TAB | Refills: 0 | Status: SHIPPED | OUTPATIENT
Start: 2017-11-11 | End: 2017-11-10

## 2017-11-10 RX ORDER — QUETIAPINE FUMARATE 25 MG/1
12.5 TABLET, FILM COATED ORAL
Qty: 30 TAB | Refills: 0 | Status: SHIPPED | OUTPATIENT
Start: 2017-11-10

## 2017-11-10 RX ORDER — ATROPINE SULFATE 0.1 MG/ML
1 INJECTION INTRAVENOUS AS NEEDED
Status: DISCONTINUED | OUTPATIENT
Start: 2017-11-10 | End: 2017-11-10 | Stop reason: HOSPADM

## 2017-11-10 RX ORDER — CLOPIDOGREL BISULFATE 75 MG/1
75 TABLET ORAL DAILY
Qty: 30 TAB | Refills: 0 | Status: SHIPPED | OUTPATIENT
Start: 2017-11-11

## 2017-11-10 RX ORDER — QUETIAPINE FUMARATE 25 MG/1
12.5 TABLET, FILM COATED ORAL
Qty: 30 TAB | Refills: 0 | Status: SHIPPED | OUTPATIENT
Start: 2017-11-10 | End: 2017-11-10

## 2017-11-10 RX ADMIN — CLOPIDOGREL BISULFATE 75 MG: 75 TABLET ORAL at 15:27

## 2017-11-10 RX ADMIN — MIDAZOLAM HYDROCHLORIDE 2 MG: 1 INJECTION, SOLUTION INTRAMUSCULAR; INTRAVENOUS at 13:13

## 2017-11-10 RX ADMIN — MIDAZOLAM HYDROCHLORIDE 2 MG: 1 INJECTION, SOLUTION INTRAMUSCULAR; INTRAVENOUS at 13:15

## 2017-11-10 RX ADMIN — FENTANYL CITRATE 50 MCG: 50 INJECTION, SOLUTION INTRAMUSCULAR; INTRAVENOUS at 13:13

## 2017-11-10 RX ADMIN — Medication 10 ML: at 15:27

## 2017-11-10 NOTE — PROGRESS NOTES
Neurology Progress Note     NAME: Betitna Lees   :  1945   MRN:  [id number]   DATE:  11/10/2017    Assessment:     Principal Problem:    CVA (cerebral vascular accident) ([facility] (2017)      Pt is a 78yo male recently admitted to [facility] on 10/23 with aphasia, nausea and vomiting, found to have multiple left MCA infarcts diagnosed with hypercholesterolemia, and diabetes and left ICA stenosis during that admission. He was started on aspirin 325 mg a day,  Lipitor, Metformin and discharged from rehab on 10/30, presenting to [address] [place] [address] 17 with worsened aphasia. MRI brain with new acute infarct on left, predominantly in temporal lobe. CTA of neck with only 50% left ICA stenosis. LDL 42.8. HgbA1C 7.6. EEG 17 is normal.   Reviewed imaging with Neuroradiology. No clear explanation for recurrent multifocal CVAs, though in a single vascular territory, appears embolic and left ICA on CTA is relatively clean. Pt has significant receptive and expressive aphasia, improved today. Appears to understand medical situation. Plan:   -DAIN to evaluate for cardioembolic source today  -Continue ASA   -Consider adding Plavix pending DAIN results (If cardioembolic source, then would need anticoagulation.)  -Continue Atorvastatin  -PT/OT/ST, Rehab    Chart reviewed since last seen  Subjective:   Pt is much calmer today, language is greatly improved. Apologizes for his behavior, expresses frustration with not being understood or able to express himself. Tells me that he is ready to have the DAIN to find out why \"this is happening\" and to speed up his discharge. Understands that he had another stroke and we do not know why.      Objective:     Current Facility-Administered Medications   Medication Dose Route Frequency    QUEtiapine (SEROquel) tablet 12.5 mg  12.5 mg Oral BID    insulin lispro (HUMALOG) injection   SubCUTAneous AC&HS    atorvastatin (LIPITOR) tablet 20 mg  20 mg Oral QHS    bromocriptine (PARLODEL) tablet 2.5 mg  2.5 mg Oral QHS    sodium chloride (NS) flush 5-10 mL  5-10 mL IntraVENous Q8H    sodium chloride (NS) flush 5-10 mL  5-10 mL IntraVENous PRN    acetaminophen (TYLENOL) tablet 650 mg  650 mg Oral Q4H PRN    Or    acetaminophen (TYLENOL) solution 650 mg  650 mg Per NG tube Q4H PRN    Or    acetaminophen (TYLENOL) suppository 650 mg  650 mg Rectal Q4H PRN    famotidine (PEPCID) tablet 20 mg  20 mg Oral Q12H    glucose chewable tablet 16 g  4 Tab Oral PRN    dextrose (D50W) injection syrg 12.5-25 g  12.5-25 g IntraVENous PRN    glucagon (GLUCAGEN) injection 1 mg  1 mg IntraMUSCular PRN    hydrALAZINE (APRESOLINE) 20 mg/mL injection 20 mg  20 mg IntraVENous Q6H PRN    aspirin (ASPIRIN) tablet 325 mg  325 mg Oral DAILY       Visit Vitals    /85 (BP 1 Location: Left arm, BP Patient Position: At rest)    Pulse 76    Temp 98.1 °F (36.7 °C)    Resp 16    Wt 84.8 kg (186 lb 15.2 oz)    SpO2 99%     Temp (24hrs), Av.2 °F (36.8 °C), Min:97.8 °F (36.6 °C), Max:98.7 °F (37.1 °C)          1901 - 11/10 0700  In: 240 [P.O.:240]  Out: -       Physical Exam:  General: Well developed well nourished patient in no apparent distress. Cardiac: Regular rate and rhythm with no murmurs. Extremities: 2+ Radial pulses, no cyanosis or edema    Neurological Exam:  Mental Status: Oriented to person, follows all simple commands could not understand FTN command after repeated attempts to explain, spontaneous speech is improved significantly. Initial calls my watch a \"clock\", but then says, \"my watch\", unable to name \"pen\". Unable to repeat at all. Cranial Nerves:   EOMI, no nystagmus, no ptosis. Facial movement is asymmetric on right. Motor:  5/5 throughout, no PD, no tremors.     Reflexes:      Sensory:      Gait:  Steady gait Cerebellar:  Able to touch his finger to my finger without dysmetria bilaterally. Lab Review   Recent Results (from the past 24 hour(s))   GLUCOSE, POC    Collection Time: 11/09/17  4:53 PM   Result Value Ref Range    Glucose (POC) 124 (H) 65 - 100 mg/dL    Performed by Lela Lane    GLUCOSE, POC    Collection Time: 11/09/17  9:44 PM   Result Value Ref Range    Glucose (POC) 165 (H) 65 - 100 mg/dL    Performed by Quoc Chen    GLUCOSE, POC    Collection Time: 11/10/17  6:31 AM   Result Value Ref Range    Glucose (POC) 141 (H) 65 - 100 mg/dL    Performed by Azra Murray (PCT)    GLUCOSE, POC    Collection Time: 11/10/17 11:28 AM   Result Value Ref Range    Glucose (POC) 144 (H) 65 - 100 mg/dL    Performed by Gladys Tim        Additional comments:  I have reviewed the patient's new clinical lab test results. I have personally reviewed the patient's radiographs. MRI   MRI Results (most recent):    Results from [facility] encounter on 11/06/17   MRI BRAIN WO CONT   Narrative Preliminary report: Acute left parietal and temporal infarcts with several foci  of associated acute left parietal intraparenchymal hemorrhage, unchanged  compared to prior CT dated November 6, 2017. Several additional acute left  frontal, parietal and left basal ganglia acute subcentimeter infarcts, not  visualized on prior CT. Final report to follow. EXAM:  MRI BRAIN WO CONT  Clinical history: Speech changes  INDICATION:    Speech changes    COMPARISON:  11/6/2017 CTA. CONTRAST: None    TECHNIQUE:    MR imaging of the brain was performed with sagittal T1, axial T1, T2, FLAIR,  GRE, DWI/ADC; multiplanar T1 images . FINDINGS:   Moderate to large area of acute infarction predominantly in the left parietal  and temporal and lobes. Additional scattered foci of infarction in the  periventricular white matter on the left. No right-sided infarctions are  demonstrated.  Punctate foci of hemorrhage are associated with the infarction in  the left parietal lobe. There is no Chiari or syrinx. There are degenerative changes in the upper  cervical spine with canal stenosis at C3-C4. Pituitary infundibulum  unremarkable. . No midline shift or mass effect. Minimal mucoperiosteal  thickening in the maxillary sinuses greater on the left. . Cavernous sinuses are  symmetric. Candice Miguel A Normal appearing flow-voids are present in the vertebral, basilar  and carotid artery systems. The craniocervical junction is normal.  The  structures at the cranial base including paranasal sinuses and mastoid air cells  are unremarkable. Impression IMPRESSION:      Moderate to large area of infarction predominantly in the left parietal and left  temporal lobes with minimal associated superimposed hemorrhage in the left  parietal lobe. There are small punctate foci of infarction in the  periventricular white matter on the left as well. There is no midline shift or mass effect. Care Plan discussed with:  Patient x   Family    RN x   Care Manager    Hospitalist:  x     Signed: El Barlow MD    Addendum: DAIN without source of thrombus, no PFO. Will add Plavix. Pt stable for d/c from neuro standpoint. Please call if needed.

## 2017-11-10 NOTE — PROGRESS NOTES
Problem: Communication Impaired (Adult)  Goal: *Acute Goals and Plan of Care (Insert Text)  Speech Goals  Initiated 11/8/2017  1. Patient will answer simple y/n questions with 25% accuracy with max cues within 7 days  2. Patient will follow one step commands with 25% accuracy with max cues within 7 days  3. Patient will complete simple confrontation and responsive naming with 40% accuracy with max cues within 7 days    Speech language pathology treatment  Patient: Karla Correa (58 y.o. male)  Date: 11/10/2017  Diagnosis: ICH (intracerebral hemorrhage) ([facility] [address] CVA (cerebral vascular accident) [facility])         ASSESSMENT:  Patient continues to present with severe receptive and mod-severe expressive aphasia. Patient with fluent speech with frequent confabulations, perseverations, jargon and paraphasias. Patient was initially able to make general appropriate statements but responses became less appropriate as the session progressed. Patient with decreased recognition of deficits and quickly became frustrated with therapy tasks and refused further treatment. Patient did not respond to verbal or phonemic cues, but could complete some tasks with visual cues. Patient continues to be unable to make basic wants/needs known and will require 24 hour supervision to ensure safety as he is unable to request help in an emergency. Progression toward goals:  []       Improving appropriately and progressing toward goals  [x]       Improving slowly and progressing toward goals  []       Not making progress toward goals and plan of care will be adjusted     PLAN:  Patient continues to benefit from skilled intervention to address the above impairments. Continue treatment per established plan of care. Discharge Recommendations: Home Health vs SNF     SUBJECTIVE:   Patient stated I don't know why you're asking me this. I don't want to do this anymore during therapy tasks.  Patient easily frustrated and did not respond well to cues or redirection. OBJECTIVE:   Mental Status:  Neurologic State: Alert, Confused, Eyes open spontaneously  Orientation Level: Oriented to person, Oriented to place, Disoriented to situation, Disoriented to time  Cognition: Follows commands, Impaired decision making  Perception: Appears intact  Perseveration: Perseverates during conversation  Safety/Judgement: Awareness of environment  Treatment & Interventions:     Language Comprehension and Expression:  Auditory Comprehension   Auditory Impairment: Yes  Response to Basic Yes/No Questions (%): 20 % (with repetition)  One-Step Basic Commands (%): 50 % (with visual cue, 0% independently)  Interfering Components: Impulsivity  Effective Techniques: Visual/Gestural cues;Repetition  Verbal Expression  Primary Mode of Expression: Verbal  Initiation: No impairment                  Naming: Impaired  Confrontation (%): 33 %                                 Response & Tolerance to Activities:     Pain:  Pain Scale 1: Numeric (0 - 10)  Pain Intensity 1: 0     After treatment:   []       Patient left in no apparent distress sitting up in chair  [x]       Patient left in no apparent distress in bed  [x]       Call bell left within reach  [x]       Nursing notified  []       Caregiver present  []       Bed alarm activated    COMMUNICATION/COLLABORATION:   Patient was educated regarding His deficit(s) of aphasia as this relates to His diagnosis of CVA. He demonstrated Guarded understanding as evidenced by no response to education. The patients plan of care was discussed with: Registered Nurse    Noam Zuniga Student SLP   Time Calculation: 12 mins     Regarding student involvement in patient care:  A student participated in this treatment session. Per CMS Medicare statements and APTA guidelines I certify that the following was true:  1. I was present and directly observed the entire session. 2. I made all skilled judgments and clinical decisions regarding care.   3. I am the practitioner responsible for assessment, treatment, and documentation.

## 2017-11-10 NOTE — PROGRESS NOTES
Bedside and Verbal shift change report given to Nicole Narvaez RN  (oncoming nurse) by Jd Frausto RN  (offgoing nurse). Report included the following information SBAR.

## 2017-11-10 NOTE — PROGRESS NOTES
0815: Patient standing in doorway asking about breakfast. Explained to patient that patient scheduled for DAIN today and has to remain NPO. Patient agitated at not being able to have food/drink. 0830: Updated Dr. Monique Medeiros that patient not on list for DAIN today. 0900: Cath Lab called and DAIN can be done at 1045AM today. Called Rigo Ladd, Caregiver listed in chart, to update on patient being agitated and seeing if consent could be obtained. 0920: Cath Lab in patient's room ready to take patient for DAIN. Patient stating \"I don't know why I have to have this, I don't want it\".

## 2017-11-10 NOTE — PROGRESS NOTES
Problem: Falls - Risk of  Goal: *Absence of Falls  Document Sunny Fall Risk and appropriate interventions in the flowsheet.    Outcome: Progressing Towards Goal  Fall Risk Interventions:  Mobility Interventions: Bed/chair exit alarm, OT consult for ADLs, Patient to call before getting OOB, PT Consult for mobility concerns, PT Consult for assist device competence, Utilize walker, cane, or other assitive device    Mentation Interventions: Bed/chair exit alarm, Door open when patient unattended, Adequate sleep, hydration, pain control, Eyeglasses and hearing aids, Increase mobility, Reorient patient, Toileting rounds, Room close to nurse's station    Medication Interventions: Bed/chair exit alarm, Patient to call before getting OOB, Teach patient to arise slowly    Elimination Interventions: Call light in reach, Toilet paper/wipes in reach, Patient to call for help with toileting needs, Toileting schedule/hourly rounds

## 2017-11-10 NOTE — DISCHARGE SUMMARY
Discharge Summary       PATIENT ID: Anthony Giles  MRN: [id number]   YOB: 1945    DATE OF ADMISSION: 11/6/2017 11:51 AM    DATE OF DISCHARGE:   PRIMARY CARE PROVIDER: Jones Leong MD       DISCHARGING PHYSICIAN: Alta Lopez MD    To contact this individual call [phone] and ask the  to page. If unavailable ask to be transferred the Adult Hospitalist Department. CONSULTATIONS: IP CONSULT TO HOSPITALIST  IP CONSULT TO NEUROSURGERY  IP CONSULT TO NEUROLOGY  IP CONSULT TO PSYCHIATRY  IP CONSULT TO CARDIOLOGY    PROCEDURES/SURGERIES: * No surgery found *    ADMITTING [address] [place] [address] COURSE:   23-year-old gentleman with past medical history of diabetes and hyperlipidemia, who presents to the hospital complaining of aphasia. Pt was just admitted to [facility] after presenting with N/V/aphasia, found to have acute left multifocal MCA infarcts, diagnosed with hypercholesterolemia, DM, and left ICA stenosis 50-79% during the admission, started on ASA 325mg daily, Lipitor 20mg daily, and metformin 500mg bid.  He was discharged from [address] [place] [address] on 10/31/17. The patient apparently had his  visit at his home and the  got concerned because the patient was confused and was having expressive as well as receptive aphasia. EMS was called. 11/10: Patient denied any acute issue. DISCHARGE DIAGNOSES / PLAN:      CVA:  His gross motor skill has been improving but patient is confused and aphasic. Aphasia is definitely worse than at discharge from [facility] as per neurologist.    MRI showed moderate to large area of infarction predominantly in the left parietal and left temporal lobes with minimal associated superimposed hemorrhage in the left parietal lobe. There are small punctate foci of infarction in theperiventricular white matter on the left as well. EEG on 11/07 was normal.   I do not think patient has ability to make the decision. Will consult psych.   I discussed with Dr. Sherwin Montana and Dr Maggie Tyler (cardiology for the DAIN). DAIN was performed today with finding of mild biatrial dilatation with normal LV size and systolic function. No thrombus in LA appendage and interatrial septum is intact, without shunt. Normal cardiac valves. Will discharge patient with ASA, statin and Plavix ( 3 months). Continue PT/OT and ST at rehab.      Encephalopathy:   Due to above. Monitor.       Hypertension:   BP is reasonable now. Not on medicines. Cardiac diet for now.        DM:   Will continue his Metformin at rehab.        See orders for other plans. VTE prophylaxis: SCD. Code status: Full  Discussed plan of care with Patient/Family and Nurse. Pre-admission lived at home. Discharge planning: Patient said he is willing to go to rehab today. Appreciated neurology and psych input. PENDING TEST RESULTS:   At the time of discharge the following test results are still pending: NA    FOLLOW UP APPOINTMENTS:    Follow-up Information     Follow up With Details Comments Contact Info    Kenny Pena MD   [facility] [address] [address]  [phone]                   DISCHARGE MEDICATIONS:  Current Discharge Medication List      START taking these medications    Details   aspirin (ASPIRIN) 325 mg tablet Take 1 Tab by mouth daily. Qty: 30 Tab, Refills: 0      clopidogrel (PLAVIX) 75 mg tab Take 1 Tab by mouth daily. Qty: 30 Tab, Refills: 0      QUEtiapine (SEROQUEL) 25 mg tablet Take 0.5 Tabs by mouth nightly. Qty: 30 Tab, Refills: 0         CONTINUE these medications which have NOT CHANGED    Details   bromocriptine (PARLODEL) 2.5 mg tablet Take 2.5 mg by mouth nightly. atorvastatin (LIPITOR) 20 mg tablet Take 20 mg by mouth nightly. metFORMIN (GLUCOPHAGE) 500 mg tablet Take 500 mg by mouth two (2) times daily (with meals). NOTIFY YOUR PHYSICIAN FOR ANY OF THE FOLLOWING:   Fever over 101 degrees for 24 hours.    Chest pain, shortness of breath, fever, chills, nausea, vomiting, diarrhea, change in mentation, falling, weakness, bleeding. Severe pain or pain not relieved by medications. Or, any other signs or symptoms that you may have questions about. DISPOSITION:    Home With:   OT  PT  HH  RN      X Long term SNF/Inpatient Rehab    Independent/assisted living    Hospice    Other:       PATIENT CONDITION AT DISCHARGE:     Functional status    Poor    X Deconditioned     Independent      Cognition     Lucid    X Forgetful, aphagic sometimes. Dementia      Catheters/lines (plus indication)    Davis     PICC     PEG    X None      Code status   X  Full code     DNR      PHYSICAL EXAMINATION AT DISCHARGE:  General:  Alert, awake, cooperative, no distress, appears stated age. Lungs:   Clear to auscultation bilaterally. Heart:  Regular rate and rhythm, S1, S2 normal, no murmur. Abdomen:   Soft, non-tender. Obese. Bowel sounds normal.   Extremities: Extremities normal, atraumatic, no cyanosis or edema. Skin: Skin color, texture, turgor normal. No rashes or lesions   Neurologic: CNII-XII intact.  Patient knows he is at the hospital. He does not know the time and date.    [address] [place] [address]:  Problem List as of 11/10/2017  Date Reviewed: 11/10/2017          Codes Class Noted - Resolved    * (Principal)Type II diabetes mellitus ([facility] [address] ICD-10-CM: E11.9  ICD-9-CM: 250.00  11/10/2017 - Present        CVA (cerebral vascular accident) [facility]) ICD-10-CM: I63.9  ICD-9-CM: 434.91  11/6/2017 - Present              Greater than 35 minutes were spent with the patient on counseling and coordination of care    Signed:   Meaghan Villela MD  11/10/2017  4:05 PM

## 2017-11-10 NOTE — PROGRESS NOTES
Pt back on floor from DAIN. Pt Drowsy but easily arousable and eyes opening spontaneously. Vitals stable. Patient resting quietly.

## 2017-11-10 NOTE — PROGRESS NOTES
TRANSFER - OUT REPORT:    Verbal report given to Felisha Stokes RN on Mary Jo Bellamy being transferred to Neuro Unit for routine progression of care       Report consisted of patients Situation, Background, Assessment and   Recommendations(SBAR). Information from the following report(s) Kardex and Procedure Summary was reviewed with the receiving nurse. Opportunity for questions and clarification was provided.

## 2017-11-10 NOTE — PROGRESS NOTES
1255:   Cardiac Cath Lab Procedure Area Arrival Note:    Adwoa Bynum arrived to Cardiac Cath Lab, Procedure Area. Patient identifiers verified with NAME and DATE OF BIRTH. Procedure verified with patient. Consent forms verified. Allergies verified. Patient informed of procedure and plan of care. Questions answered with review. Patient voiced understanding of procedure and plan of care. Patient on cardiac monitor, non-invasive blood pressure, SPO2 monitor. On RA placed on O2 @ 2 lpm via NC.  IV of NS on pump at 25 ml/hr. Patient status doing well without problems. Patient is A&Ox 1. Patient reports no pain. Patient medicated during procedure with orders obtained and verified by Dr. Kenton Red. Refer to patients Cardiac Cath Lab PROCEDURE REPORT for vital signs, assessment, status, and response during procedure, printed at end of case. Printed report on chart or scanned into chart.

## 2017-11-10 NOTE — PROGRESS NOTES
Patient refused to leave room for DAIN at 0920. After speaking with family member, patient agrees to have the test done. 1130 called cath lab to see if they would be able to fit Mr. Citlalli Sánchez in again. They called back to report that he would probably be fit in with Dr. Frankie Marin later this afternoon.

## 2017-11-10 NOTE — PROCEDURES
Cardiac Catheterization Procedure Note   Patient: Tabitha Wilkes  MRN: [id number]  SSN: [id number]   YOB: 1945 Age: 71 y.o. Sex: male    Date of Procedure: 11/10/2017   Pre-procedure Diagnosis: Cardiac Source of Embolism  Post-procedure Diagnosis: No Intracardiac Source of Infection  Procedure: DAIN  :  Dr. Kath Quiñones MD    Assistant(s):  None  Anesthesia: Moderate Sedation   Estimated Blood Loss: Less than 10 mL   Specimens Removed: None  Findings: Mild biatrial dilatation with normal LV size and systolic function. No thrombus in LA appendage and interatrial septum is intact, without shunt. Normal cardiac valves.   Complications: None   Implants:  None  Signed by:  Kath Quiñones MD  11/10/2017  1:24 PM

## 2017-11-10 NOTE — PROGRESS NOTES
Bedside and Verbal shift change report given to Steffanie Diego RN (oncoming nurse) by Jt Brady RN (offgoing nurse). Report included the following information SBAR, Kardex, Intake/Output, MAR and Recent Results.

## 2017-11-10 NOTE — PROGRESS NOTES
Patient discharged to [address] [place] [address] today. No ambulance preference, so [facility] will transport at 6 pm today.

## 2017-11-10 NOTE — DISCHARGE INSTRUCTIONS
Discharge Instructions       PATIENT ID: Carla Clement  MRN: [id number]   YOB: 1945    DATE OF ADMISSION: 11/6/2017 11:51 AM    DATE OF DISCHARGE: 11/10/2017    PRIMARY CARE PROVIDER: Trinh Christopher MD       DISCHARGING PHYSICIAN: Ronald Ridley MD    To contact this individual call [phone] and ask the  to page. If unavailable ask to be transferred the Adult Hospitalist Department. DISCHARGE DIAGNOSES CVA    CONSULTATIONS: IP CONSULT TO HOSPITALIST  IP CONSULT TO NEUROSURGERY  IP CONSULT TO NEUROLOGY  IP CONSULT TO PSYCHIATRY  IP CONSULT TO CARDIOLOGY    PROCEDURES/SURGERIES: * No surgery found *    PENDING TEST RESULTS:   At the time of discharge the following test results are still pending: NA    FOLLOW UP APPOINTMENTS:   Follow-up Information     Follow up With Details Comments Contact Info    Trinh Christopher MD   [address] [facility] [id number] [phone]             ADDITIONAL CARE RECOMMENDATIONS:   Please follow up with your primary care provider in 1 to 2 weeks of discharge. Please follow up with Neurologist, Dr Virgina Bosworth in 3 to 4 weeks of discharge      DIET: Cardiac Diet and Diabetic Diet    ACTIVITY: Activity as tolerated      DISCHARGE MEDICATIONS:   See Medication Reconciliation Form    · It is important that you take the medication exactly as they are prescribed. · Keep your medication in the bottles provided by the pharmacist and keep a list of the medication names, dosages, and times to be taken in your wallet. · Do not take other medications without consulting your doctor. NOTIFY YOUR PHYSICIAN FOR ANY OF THE FOLLOWING:   Fever over 101 degrees for 24 hours. Chest pain, shortness of breath, fever, chills, nausea, vomiting, diarrhea, change in mentation, falling, weakness, bleeding. Severe pain or pain not relieved by medications. Or, any other signs or symptoms that you may have questions about.       DISPOSITION:    Home With:   OT  PT [facility]  RN       SNF/Inpatient Rehab/LTAC    Independent/assisted living    Hospice    Other:     CDMP Checked:    Yes X       Signed:   Piter Cheng MD  11/10/2017  4:04 PM

## 2017-11-10 NOTE — PROGRESS NOTES
TRANSFER - IN REPORT:    Verbal report received from Deanna Edwards RN on Nicole Kenyon  being received from cath lab procedure area  for routine progression of care. Report consisted of patients Situation, Background, Assessment and Recommendations(SBAR). Information from the following report(s) Kardex and Procedure Summary was reviewed with the receiving clinician. Opportunity for questions and clarification was provided. Assessment completed upon patients arrival to [address] [place] [address] and care assumed. Cardiac Cath Lab Recovery Arrival Note:    Nicole Kenyon arrived to [facility] recovery area. Patient procedure= DAIN. Patient on cardiac monitor, non-invasive blood pressure, SPO2 monitor. On  O2 @ 2 lpm via NC.  IV  of NS on pump at 25 ml/hr. Patient status doing well without problems. Patient is A&Ox 1. No change in patient status. Continue to monitor patient and status.

## 2017-11-10 NOTE — ROUTINE PROCESS
Bedside and Verbal shift change report given to [address] [place] [address] (oncoming nurse) by Kenneth Flower (offgoing nurse). Report included the following information SBAR, Kardex, Procedure Summary, Intake/Output, MAR, Accordion and Cardiac Rhythm NSR.

## 2017-11-10 NOTE — INTERDISCIPLINARY ROUNDS
IDR/SLIDR Summary          Patient: Bola Carrasco MRN: [id number]    Age: 71 y.o. YOB: 1945 Room/Bed: [facility]   Admit Diagnosis: ICH (intracerebral hemorrhage) ([facility])  Principal Diagnosis: CVA (cerebral vascular accident) ([facility]   Goals: d/c planning  Readmission: NO  Quality Measure: Not applicable  VTE Prophylaxis: Mechanical  Influenza Vaccine screening completed? YES  Pneumococcal Vaccine screening completed? YES  Mobility needs: Yes   Nutrition plan:Yes  Consults:P.T, O.T. and Case Management    Financial concerns:No  Escalated to CM? YES  RRAT Score: 12   Interventions:Home Health  Testing due for pt today?  YES  LOS: 4 days Expected length of stay 4 days  Discharge plan: TBD   PCP: Ronen Cook MD  Transportation needs: Yes    Days before discharge:one day until discharge   Discharge disposition: TBD    Signed:     Lynne Parkinson  11/10/2017  1:29 AM

## 2017-11-14 NOTE — PROCEDURES
[address] [facility] [address], [address]   EEG       Name:  Noman Workman   MR#:  [id number]   :  1945   Account #:  [de-identified]    Date of Procedure:  2017   Date of Adm:  2017       HISTORY: This is a 61-year-old gentleman with recent stroke causing   aphasia who presented with worsening of his aphasia. EEG is   performed to evaluate for evidence of seizure as an etiology. MEDICATIONS     1. Aspirin. 2. Lipitor. 3. Bromocriptine. 4. Pepcid. 5. Hydralazine p.r.n.    6. Insulin. CONDITIONS: This is a routine 21-channel digital EEG recording with   1 channel devoted to limited EKG performed in accordance with   international 10-20 system. The study was done during a state of   wakefulness and drowsiness. Photic stimulation was performed as an   activating procedure. DESCRIPTION OF PROCEDURE: Upon maximal arousal, the   posterior dominant rhythm had a frequency of 9 Hz, amplitude of 20   mcV. This activity is symmetric in the bilateral posterior derivations and   attenuated with eye opening. Photic stimulation did not significantly   alter the tracing. The patient becomes drowsy, but deeper stages of   sleep are not obtained. There were no focal abnormalities, epileptiform   discharges, or electrographic seizures seen. INTERPRETATION: This is a normal awake and drowsy   electroencephalogram.     CLINICAL CORRELATION: A normal EEG does not definitively   exclude a diagnosis of epilepsy. If clinical suspicion is high, consider   sleep deprived EEG.         MD Mary Andrade / Dhara    D:  2017   17:13   T:  2017   11:39   Job #:  [id number]

## 2017-12-03 NOTE — PROCEDURES
[address] [facility] [address]  [address] PROCEDURE       Name:  Kassi Asher   MR#:  [id number]   :  1945   Account #:  [de-identified]    Date of Procedure:  11/10/2017   Date of Adm:  2017       STUDY/CINE: **    ANESTHESIA:  conscius sedation under my direct supervision    PROCEDURE: Transesophageal echocardiography         IDENTIFICATION: The patient is a 78-year-old man who was admitted   to the hospital with recurrent cerebrovascular accident, and was   brought to the catheterization laboratory for transesophageal   echocardiographic examination to assess for cardiogenic source of   embolus. DESCRIPTION OF PROCEDURE: The patient was sedated per cath   lab protocol under my direct supervision throughout. A   transesophageal probe was passed without difficulty. Procedure was   well tolerated. FINDINGS: Aortic valve is trileaflet, mild thickened without stenosis. The aortic root is normal size. The left atrium is mildly dilated. The   mitral and tricuspid valves are structurally normal with normal leaflet   excursions. The left ventricle is normal size with normal wall thickness. There are no left ventricular segmental wall motion abnormalities and   left ventricular systolic function is normal with an estimated ejection   fraction of 60% to 65%. The right atrium is mildly dilated and the right   ventricle is normal size. The pulmonic valve, pulmonary arteries are   normal. Pulmonary veins are normal. The interatrial septum is intact   and there is no evidence of shunt by both color Doppler and bubble   contrast.    CONCLUSION: Mild biatrial dilatation in an otherwise normal   transesophageal echocardiographic examination.         MD Madisyn Blakemar 112 / TB   D:  2017   07:39   T:  2017   07:57   Job #:  [id number]

## 2019-07-23 NOTE — PROGRESS NOTES
1516. Pt to ICU  1550. Dr. Dinh Munguia at the bedside to evaluate the patient. 1558. Neurology consult called.   1600. Spoke with Dr. Vivian Galeana. Updated on patient status. 1926. Spoke with patient's PCP and updated them on patient's status. n/a

## 2023-02-14 NOTE — PROGRESS NOTES
Caller:     Relationship:     WORK   person, Oriented to place, Disoriented to situation, Disoriented to time  Cognition: Follows commands, Impaired decision making  Safety/Judgement: Awareness of environment  Functional Mobility Training:  Bed Mobility:                     Transfers:  Sit to Stand: Modified independent  Stand to Sit: Modified independent                             Balance:  Sitting: Intact  Standing: Intact  Ambulation/Gait Training:  Distance (ft): 500 Feet (ft)     Ambulation - Level of Assistance: Supervision        Gait Abnormalities: Decreased step clearance        Base of Support: Narrowed        Step Length: Left shortened;Right shortened               Stairs:  Number of Stairs Trained: 8 (step over step)  Stairs - Level of Assistance: Stand-by asssistance;Contact guard assistance  Rail Use: Right     Neuro Re-Education:    Therapeutic Exercises:     Pain:  Pain Scale 1: Numeric (0 - 10)  Pain Intensity 1: 0              Activity Tolerance:   Limited   Please refer to the flowsheet for vital signs taken during this treatment.   After treatment:   [] Patient left in no apparent distress sitting up in chair  [x] Patient left in no apparent distress in bed  [x] Call bell left within reach  [x] Nursing notified  [] Caregiver present  [] Bed alarm activated    COMMUNICATION/COLLABORATION:   The patients plan of care was discussed with: Registered Nurse    Monse Sprague PTA   Time Calculation: 10 mins